# Patient Record
Sex: FEMALE | Race: WHITE | NOT HISPANIC OR LATINO | Employment: STUDENT | URBAN - METROPOLITAN AREA
[De-identification: names, ages, dates, MRNs, and addresses within clinical notes are randomized per-mention and may not be internally consistent; named-entity substitution may affect disease eponyms.]

---
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
## 2023-07-05 ENCOUNTER — HOSPITAL ENCOUNTER (EMERGENCY)
Facility: HOSPITAL | Age: 18
Discharge: HOME | End: 2023-07-05
Attending: EMERGENCY MEDICINE
Payer: COMMERCIAL

## 2023-07-05 ENCOUNTER — APPOINTMENT (EMERGENCY)
Dept: NON INVASIVE DIAGNOSTICS | Facility: HOSPITAL | Age: 18
End: 2023-07-05
Payer: COMMERCIAL

## 2023-07-05 VITALS
OXYGEN SATURATION: 100 % | DIASTOLIC BLOOD PRESSURE: 66 MMHG | RESPIRATION RATE: 18 BRPM | TEMPERATURE: 97.7 F | SYSTOLIC BLOOD PRESSURE: 135 MMHG | BODY MASS INDEX: 32.49 KG/M2 | HEART RATE: 99 BPM | WEIGHT: 201.28 LBS

## 2023-07-05 DIAGNOSIS — M79.10 MYALGIA: ICD-10-CM

## 2023-07-05 DIAGNOSIS — J02.9 SORE THROAT: Primary | ICD-10-CM

## 2023-07-05 LAB — S PYO DNA THROAT QL NAA+PROBE: NOT DETECTED

## 2023-07-05 PROCEDURE — 0241U HB NFCT DS VIR RESP RNA 4 TRGT: CPT

## 2023-07-05 PROCEDURE — 93970 EXTREMITY STUDY: CPT

## 2023-07-05 PROCEDURE — 87651 STREP A DNA AMP PROBE: CPT

## 2023-07-05 RX ORDER — ACETAMINOPHEN 325 MG/1
650 TABLET ORAL ONCE
Status: COMPLETED | OUTPATIENT
Start: 2023-07-05 | End: 2023-07-05

## 2023-07-05 RX ADMIN — ACETAMINOPHEN 325MG 650 MG: 325 TABLET ORAL at 21:30

## 2023-07-05 NOTE — Clinical Note
Yojana Gallardo was seen and treated in our emergency department on 7/5/2023. Diagnosis:     Tisha  . She may return on this date: If you have any questions or concerns, please don't hesitate to call.       Sylvester Mcintyre PA-C    ______________________________           _______________          _______________  Hospital Representative                              Date                                Time

## 2023-07-06 LAB
FLUAV RNA RESP QL NAA+PROBE: NEGATIVE
FLUBV RNA RESP QL NAA+PROBE: NEGATIVE
RSV RNA RESP QL NAA+PROBE: NEGATIVE
SARS-COV-2 RNA RESP QL NAA+PROBE: POSITIVE

## 2023-07-06 PROCEDURE — 93970 EXTREMITY STUDY: CPT | Performed by: SURGERY

## 2023-07-06 NOTE — RESULT ENCOUNTER NOTE
Patient aware of positive covid test, disucssed isolation and treatment no further question. Performed Resulted

## 2023-07-06 NOTE — DISCHARGE INSTRUCTIONS
Please return to the ED for any concerns as outlined in the AVS or for any other concerns. Please follow-up with your primary care provider in 2 days for re-evaluation and further management. Continue ibuprofen or acetaminophen as needed for pain control. Continue to stay well-hydrated. Follow-up on your COVID/flu/RSV result through MyChart.

## 2023-07-06 NOTE — ED PROVIDER NOTES
History  Chief Complaint   Patient presents with   • Sore Throat     Sore throat for two hours     49-year-old female with no reported past medical history presenting to the ED with a complaint of sore throat and bilateral calf pain. Patient reports has been having a sore throat for the last few hours. States that it is uncomfortable and is exacerbated with swallowing but she denies inability to swallow, drooling, throat swelling, tongue swelling and any other associated complaint. Reports her godmother was having similar symptoms and was diagnosed with strep throat. Reports she was around this person yesterday. (+) Rhinorrhea. (+) Mild headache which she describes as frontal without radiation. Denies sudden onset, worst headache of her life, focal deficits. Patient also reports that she has been having bilateral calf pain, described as a cramping sensation. Left >right. Patient is never had this before. No injury or trauma to the legs. No LE edema. Patient reports she is concerned for DVT as her father was diagnosed with a DVT and PE with no known inciting event, still being worked up by his doctor. Denies complaints of shortness of breath, palpitations, chest pain, personal history of PE/DVT, bleeding/clotting disorder, hemoptysis, lower extremity edema, recent prolonged travel, recent injury/trauma to the lower extremities, recent surgery, history of cancer, oral contraceptive and estrogen therapy. Denies any other complaints today and reports that she has otherwise been well. Specifically denies fever, chills, cough, nasal congestion, abdominal pain, nausea, vomiting, diarrhea, constipation, urinary complaints, back pain, neck pain, rash and any other complaint. No recent travel outside the [address] [place] [address]. No medications PTA. Prior to Admission Medications   Prescriptions Last Dose Informant Patient Reported? Taking?    DULoxetine (CYMBALTA) 30 mg delayed release capsule   No No   Sig: Take 1 capsule (30 mg total) by mouth daily   norgestimate-ethinyl estradiol (TRI-SPRINTEC) 0.18/0.215/0.25 MG-35 MCG per tablet   No No   Sig: Take 1 tablet by mouth daily   Patient not taking: No sig reported      Facility-Administered Medications: None       Past Medical History:   Diagnosis Date   • Abdominal pain    • Obesity        Past Surgical History:   Procedure Laterality Date   • DENTAL SURGERY         Family History   Problem Relation Age of Onset   • Arthritis Mother    • Asthma Mother    • Depression Mother    • ADD / ADHD Father    • ADD / ADHD Sister    • Depression Sister    • Irritable bowel syndrome Maternal Grandmother    • Stroke Maternal Grandmother    • Heart disease Paternal Grandfather    • Diabetes Maternal Aunt      I have reviewed and agree with the history as documented. E-Cigarette/Vaping     E-Cigarette/Vaping Substances     Social History     Tobacco Use   • Smoking status: Never   • Smokeless tobacco: Never       Review of Systems   HENT: Positive for rhinorrhea and sore throat. Musculoskeletal:        (+) b/l  leg pain    Neurological: Positive for headaches. All other systems reviewed and are negative. Physical Exam  Physical Exam  Vitals and nursing note reviewed. Constitutional:       General: She is not in acute distress. Appearance: She is well-developed. HENT:      Head: Normocephalic and atraumatic. Right Ear: Tympanic membrane and ear canal normal.      Left Ear: Tympanic membrane and ear canal normal.      Mouth/Throat:      Comments: MMM. Addi Piety Oropharynx patent and clear. No erythema, swelling, exudates, lesions or sores. Uvula midline. Patient maintaining oral airway and secretions without issue. Normal phonation. No TTP  or induration below the tongue or in the submandibular area. Eyes:      Conjunctiva/sclera: Conjunctivae normal.   Cardiovascular:      Rate and Rhythm: Normal rate and regular rhythm.       Heart sounds: No murmur heard.  Pulmonary:      Effort: Pulmonary effort is normal. No respiratory distress. Breath sounds: Normal breath sounds. Abdominal:      Palpations: Abdomen is soft. Tenderness: There is no abdominal tenderness. Musculoskeletal:         General: No swelling. Cervical back: Neck supple. Comments: Patient reports TTP on the proximal, lateral aspect of B/L calves. No LE edema. Calves are similar in size bilaterally. No associated erythema, swelling, bruising, skin disruption, abrasion, crepitus or any other concerning findings. B/L DP pulses 2+. No bony TTP over B/L LE. No Achilles tendon disruption. Lymphadenopathy:      Cervical: No cervical adenopathy. Skin:     General: Skin is warm and dry. Capillary Refill: Capillary refill takes less than 2 seconds. Neurological:      General: No focal deficit present. Mental Status: She is alert and oriented to person, place, and time. GCS: GCS eye subscore is 4. GCS verbal subscore is 5. GCS motor subscore is 6.    Psychiatric:         Mood and Affect: Mood normal.         Vital Signs  ED Triage Vitals   Temperature Pulse Respirations Blood Pressure SpO2   07/05/23 2050 07/05/23 2050 07/05/23 2050 07/05/23 2050 07/05/23 2050   97.7 °F (36.5 °C) 99 18 135/66 100 %      Temp src Heart Rate Source Patient Position - Orthostatic VS BP Location FiO2 (%)   -- 07/05/23 2050 -- -- --    Monitor         Pain Score       07/05/23 2130       6           Vitals:    07/05/23 2050   BP: 135/66   Pulse: 99         Visual Acuity      ED Medications  Medications   acetaminophen (TYLENOL) tablet 650 mg (650 mg Oral Given 7/5/23 2130)       Diagnostic Studies  Results Reviewed     Procedure Component Value Units Date/Time    Strep A PCR [[id number]]  (Normal) Collected: 07/05/23 2110    Lab Status: Final result Specimen: Throat Updated: 07/05/23 2138     STREP A PCR Not Detected    FLU/RSV/COVID - if FLU/RSV clinically relevant [[id number]] Collected: 07/05/23 2126    Lab Status: In process Specimen: Nares from Nose Updated: 07/05/23 2132                 VAS lower limb venous duplex study, complete bilateral    (Results Pending)              Procedures  Procedures         ED Course  ED Course as of 07/06/23 0522 Wed Jul 05, 2023 2228 Per TT from vascular tech, patient is negative for DVT. 2230 STREP A PCR: Not Detected  Negative      2230 Unfortunate this time COVID/flu/RSV are send out. We will have patient follow-up through her PCP or MyChart. Other supportive care and follow-up as outlined in the AVS.  Strict return precautions verbally communicated to the patient as outlined in the AVS.  All patient questions and concerns were answered. Patient verbally communicated their understanding and agreement to the above plan. Patient stable at discharge. Portions of the record may have been created with voice recognition software. Occasional wrong word or "sound a like" substitutions may have occurred due to the inherent limitations of voice recognition software. Read the chart carefully and recognize, using context, where substitutions have occurred. EDSON    Flowsheet Row Most Recent Value   [facility] Initial Screen: During the past 12 months, did you:    1. Drink any alcohol (more than a few sips)? No Filed at: 07/05/2023 2053   2. Smoke any marijuana or hashish No Filed at: 07/05/2023 2053   3. Use anything else to get high? ("anything else" includes illegal drugs, over the counter and prescription drugs, and things that you sniff or 'bonilla')? No Filed at: 07/05/2023 2053                                          Medical Decision Making  55-year-old female presents to the ED with a main complaint of a sore throat which began shortly prior to arrival.  Painful swallowing but no other associated complaints. Patient's godmother was recently diagnosed with strep and she was around her.   Patient also concerned for bilateral calf pain that also began today. No new inciting event. No other associated complaints. Concerned because her father was recently diagnosed with DVT and PE with no inciting event. Patient has no overt risk factors for VTE. On exam patient is a very well-appearing 20-year-old female resting in stretcher no acute distress. VSS. MMM and oropharynx patent and clear. No cervical lymphadenopathy. Patient also reports TTP over the lateral, proximal aspects of B/L calves. Left seems to be worse than right. No other concerning LE findings. B/L LE are otherwise NVI. PE otherwise unremarkable. A&O x3. GCS 15. No focal neurologic deficits. In light of patient's presenting complaints of sore throat we will check a rapid strep. Also check a COVID/flu/RSV. Patient without any specific risk factors for VTE other than possible family history. Low concern for DVT at this time however will check a duplex ultrasound to rule out DVT versus other concerning etiology. Favor strain versus sprain at this time. Doubt Achilles tendon disruption, arterial occlusion, DVT, fracture, compartment syndrome, cellulitis, osteomyelitis, NSTI. Will treat symptomatically with acetaminophen. No other complaints from patient or findings on PE to warrant further labs or imaging at this time. Likely discharge however will reevaluate after above-stated plan. Myalgia: acute illness or injury  Sore throat: acute illness or injury  Amount and/or Complexity of Data Reviewed  Labs: ordered. Decision-making details documented in ED Course. Risk  OTC drugs.           Disposition  Final diagnoses:   Sore throat   Myalgia     Time reflects when diagnosis was documented in both MDM as applicable and the Disposition within this note     Time User Action Codes Description Comment    7/5/2023 10:31 PM Jovanna West Add [J02.9] Sore throat     7/5/2023 10:31 PM Jovanna West Add [M79.10] Myalgia       ED Disposition     ED Disposition Discharge    Condition   Stable    Date/Time   Wed Jul 5, 2023 10:31 PM    Comment   Ele Osei discharge to home/self care. Follow-up Information     Follow up With Specialties Details Why [address] [facility] Emergency Medicine Go to  If symptoms worsen [address] [facility] [id number]  [address] [facility], [address], [place], [address]          Discharge Medication List as of 7/5/2023 10:32 PM      CONTINUE these medications which have NOT CHANGED    Details   DULoxetine (CYMBALTA) 30 mg delayed release capsule Take 1 capsule (30 mg total) by mouth daily, Starting Thu 8/25/2022, Normal      norgestimate-ethinyl estradiol (TRI-SPRINTEC) 0.18/0.215/0.25 MG-35 MCG per tablet Take 1 tablet by mouth daily, Starting Mon 12/10/2018, Normal             No discharge procedures on file.     PDMP Review     None          ED Provider  Electronically Signed by           Ximena Dwyer PA-C  07/06/23 0524

## 2023-07-06 NOTE — ED NOTES
Vascular tech [facility] made aware of bilateral LE study.       Eyad Rivas RN  07/05/23 [id number]

## 2023-07-19 DIAGNOSIS — F41.8 DEPRESSION WITH ANXIETY: Primary | ICD-10-CM

## 2023-07-24 ENCOUNTER — TELEPHONE (OUTPATIENT)
Dept: PSYCHIATRY | Facility: CLINIC | Age: 18
End: 2023-07-24

## 2023-07-24 NOTE — TELEPHONE ENCOUNTER
Contacted pt in regards to routine referral and to be placed on proper waitlist. Lvm for pt to contact intake dept. Please advise, pt is added to waitlist but does not have pref listed.  If they have pref, please edit referral.

## 2023-09-29 ENCOUNTER — TELEPHONE (OUTPATIENT)
Dept: PSYCHIATRY | Facility: CLINIC | Age: 18
End: 2023-09-29

## 2023-09-29 NOTE — TELEPHONE ENCOUNTER
A message was left for the patient to return call to intake. Pt will be scheduled ASAP with Triston Rg at that time.

## 2023-10-03 NOTE — TELEPHONE ENCOUNTER
Patient called and lvm stating she was returning vm she received in regards to scheduling, writer called patient back and lvm for her to call back at her earliest convenience

## 2023-10-04 NOTE — TELEPHONE ENCOUNTER
Patient contacted the office requesting to speak with the . Writer informed the patient that he would notify the  of the call. The  will be in contact at her earliest convenience to assist with scheduling.

## 2023-10-06 ENCOUNTER — TELEPHONE (OUTPATIENT)
Dept: PSYCHIATRY | Facility: CLINIC | Age: 18
End: 2023-10-06

## 2023-11-09 ENCOUNTER — TELEPHONE (OUTPATIENT)
Dept: PSYCHIATRY | Facility: CLINIC | Age: 18
End: 2023-11-09

## 2024-01-08 ENCOUNTER — SOCIAL WORK (OUTPATIENT)
Dept: BEHAVIORAL/MENTAL HEALTH CLINIC | Facility: CLINIC | Age: 19
End: 2024-01-08
Payer: COMMERCIAL

## 2024-01-08 ENCOUNTER — OFFICE VISIT (OUTPATIENT)
Dept: FAMILY MEDICINE CLINIC | Facility: CLINIC | Age: 19
End: 2024-01-08
Payer: COMMERCIAL

## 2024-01-08 ENCOUNTER — TELEPHONE (OUTPATIENT)
Dept: FAMILY MEDICINE CLINIC | Facility: CLINIC | Age: 19
End: 2024-01-08

## 2024-01-08 VITALS
HEIGHT: 66 IN | DIASTOLIC BLOOD PRESSURE: 78 MMHG | TEMPERATURE: 97.8 F | BODY MASS INDEX: 35.23 KG/M2 | WEIGHT: 219.2 LBS | SYSTOLIC BLOOD PRESSURE: 98 MMHG | HEART RATE: 60 BPM | OXYGEN SATURATION: 99 %

## 2024-01-08 DIAGNOSIS — Z11.4 SCREENING FOR HIV (HUMAN IMMUNODEFICIENCY VIRUS): ICD-10-CM

## 2024-01-08 DIAGNOSIS — Z23 ENCOUNTER FOR IMMUNIZATION: ICD-10-CM

## 2024-01-08 DIAGNOSIS — Z13.29 SCREENING FOR THYROID DISORDER: ICD-10-CM

## 2024-01-08 DIAGNOSIS — F32.A ANXIETY AND DEPRESSION: Primary | ICD-10-CM

## 2024-01-08 DIAGNOSIS — Z13.1 SCREENING FOR DIABETES MELLITUS: ICD-10-CM

## 2024-01-08 DIAGNOSIS — F41.9 ANXIETY AND DEPRESSION: Primary | ICD-10-CM

## 2024-01-08 DIAGNOSIS — Z11.59 NEED FOR HEPATITIS C SCREENING TEST: ICD-10-CM

## 2024-01-08 DIAGNOSIS — Z13.0 SCREENING, ANEMIA, DEFICIENCY, IRON: ICD-10-CM

## 2024-01-08 DIAGNOSIS — F41.8 DEPRESSION WITH ANXIETY: Primary | ICD-10-CM

## 2024-01-08 DIAGNOSIS — Z11.3 SCREEN FOR STD (SEXUALLY TRANSMITTED DISEASE): ICD-10-CM

## 2024-01-08 DIAGNOSIS — Z13.220 SCREENING, LIPID: ICD-10-CM

## 2024-01-08 PROCEDURE — 99214 OFFICE O/P EST MOD 30 MIN: CPT | Performed by: NURSE PRACTITIONER

## 2024-01-08 PROCEDURE — 90471 IMMUNIZATION ADMIN: CPT

## 2024-01-08 PROCEDURE — 90715 TDAP VACCINE 7 YRS/> IM: CPT

## 2024-01-08 PROCEDURE — 90472 IMMUNIZATION ADMIN EACH ADD: CPT

## 2024-01-08 PROCEDURE — 90791 PSYCH DIAGNOSTIC EVALUATION: CPT

## 2024-01-08 PROCEDURE — 90686 IIV4 VACC NO PRSV 0.5 ML IM: CPT

## 2024-01-08 NOTE — PROGRESS NOTES
[facility]    ASSESSMENT AND PLAN     1. Anxiety and depression  Assessment & Plan:  Symptoms and treatment options reviewed at length  We will trial sertraline   Started 25 x 4 days.  Increase to 50 if tolerating without issue   Dose/use/potential side effects reviewed  May trial as needed hydroxyzine for increased anxiety attacks  Follow-up 4 weeks for symptom check and assess medication effectiveness  Return to care sooner with problems or concerns    Complete lab work prior to follow-up    Orders:  -     sertraline (ZOLOFT) 50 mg tablet; Take 1 tablet (50 mg total) by mouth daily    2. Screen for STD (sexually transmitted disease)  -     Chlamydia/GC amplified DNA by PCR; Future  -     RPR-Syphilis Screening (Total Syphilis IGG/IGM); Future    3. Screening for diabetes mellitus  -     Comprehensive metabolic panel; Future    4. Screening, anemia, deficiency, iron  -     CBC and differential; Future    5. Screening for HIV (human immunodeficiency virus)  -     HIV 1/2 AG/AB W REFLEX [facility] and [facility] only; Future    6. Need for hepatitis C screening test  -     Hepatitis C RNA, Quantitative PCR, [facility]; Future    7. Screening for thyroid disorder  -     TSH, 3rd generation with Free T4 reflex; Future    8. Screening, lipid  -     Lipid panel; Future    9. Encounter for immunization  -     influenza vaccine, quadrivalent, 0.5 mL, preservative-free, for adult and pediatric patients 6 mos+ (AFLURIA, FLUARIX, FLULAVAL, FLUZONE)  -     TDAP VACCINE GREATER THAN OR EQUAL TO 8YO IM           SUBJECTIVE       Patient ID: Tisha Brown is a 18 y.o. female.    Chief Complaint   Patient presents with    Physical Exam       HISTORY OF PRESENT ILLNESS    Presents to discuss depression/anxiety. Note longstanding history but reports worsening over the last several months. Has been on Celexa and Cymbalta in the past. Noted some improvement but never felt her symptoms completely resolved. She has been off any  "medication now for about one year. Reports she is a perfectionist and puts a lot of pressure on herself. This increases her anxiety. Notes more emotional lately - crying easily. She is attributing it to frustration. She reports feeling randomly sad/dressed. Denies ever having any SI/HI. She believes there may a hormonal component, as she does note some change during her cycle (increased).  She is looking at restarting mental health medication and interested in starting birth control.     Of note: She was recently pregnant and was treated at [facility] with Plan B. The pregnancy was unplanned. They were practicing safe sex practices. She reports ok with her decision. She and her boyfriend are still together Both he and her parents were very supportive.            The following portions of the patient's history were reviewed and updated as appropriate: allergies, current medications, past family history, past medical history, past social history, past surgical history, and problem list.    REVIEW OF SYSTEMS  Review of Systems   Constitutional: Negative.    Respiratory: Negative.     Cardiovascular: Negative.    Neurological: Negative.    Psychiatric/Behavioral:  Positive for dysphoric mood and sleep disturbance. Negative for self-injury and suicidal ideas. The patient is nervous/anxious.         Feels over emotional       OBJECTIVE      VITAL SIGNS  BP 98/78 (BP Location: Left arm, Patient Position: Sitting, Cuff Size: Adult)   Pulse 60   Temp 97.8 °F (36.6 °C)   Ht 5' 5.5\" (1.664 m)   Wt 99.4 kg (219 lb 3.2 oz)   LMP 12/15/2023 (Approximate)   SpO2 99%   BMI 35.92 kg/m²     CURRENT MEDICATIONS    Current Outpatient Medications:     sertraline (ZOLOFT) 50 mg tablet, Take 1 tablet (50 mg total) by mouth daily, Disp: 30 tablet, Rfl: 1    norgestimate-ethinyl estradiol (TRI-SPRINTEC) 0.18/0.215/0.25 MG-35 MCG per tablet, Take 1 tablet by mouth daily (Patient not taking: Reported on 2/21/2020), Disp: 28 " tablet, Rfl: 5      PHYSICAL EXAMINATION   Physical Exam  Vitals and nursing note reviewed. Chaperone present: mom present.   Constitutional:       General: She is not in acute distress.     Appearance: Normal appearance. She is well-developed and well-groomed. She is not ill-appearing.   HENT:      Head: Normocephalic.   Cardiovascular:      Rate and Rhythm: Normal rate and regular rhythm.   Pulmonary:      Effort: Pulmonary effort is normal. No respiratory distress.      Breath sounds: Normal breath sounds and air entry.   Neurological:      Mental Status: She is alert and oriented to person, place, and time.   Psychiatric:         Attention and Perception: Attention normal.         Mood and Affect: Mood normal.         Behavior: Behavior normal.         Thought Content: Thought content normal.         Judgment: Judgment normal.

## 2024-01-08 NOTE — ASSESSMENT & PLAN NOTE
Symptoms and treatment options reviewed at length  We will trial sertraline   Started 25 x 4 days.  Increase to 50 if tolerating without issue   Dose/use/potential side effects reviewed  May trial as needed hydroxyzine for increased anxiety attacks  Follow-up 4 weeks for symptom check and assess medication effectiveness  Return to care sooner with problems or concerns    Complete lab work prior to follow-up

## 2024-01-29 ENCOUNTER — TELEMEDICINE (OUTPATIENT)
Dept: BEHAVIORAL/MENTAL HEALTH CLINIC | Facility: CLINIC | Age: 19
End: 2024-01-29
Payer: COMMERCIAL

## 2024-01-29 DIAGNOSIS — F41.9 ANXIETY AND DEPRESSION: Primary | ICD-10-CM

## 2024-01-29 DIAGNOSIS — F32.A ANXIETY AND DEPRESSION: Primary | ICD-10-CM

## 2024-01-29 PROCEDURE — 90837 PSYTX W PT 60 MINUTES: CPT

## 2024-01-29 NOTE — BH TREATMENT PLAN
"Outpatient Behavioral Health Psychotherapy Treatment Plan    Tisha Brown  2005     Date of Initial Psychotherapy Assessment: 1/8/24   Date of Current Treatment Plan: 01/29/24  Treatment Plan Target Date: ongoing  Treatment Plan Expiration Date: 6/2024    Diagnosis:   1. Anxiety and depression            Area(s) of Need: coping skills, anxiety, depression     Long Term Goal 1 (in the client's own words): \"I want to finish school and pay of debt\"     Stage of Change: Action    Target Date for completion: 12 months     Anticipated therapeutic modalities: CBT, DBT, client centered      People identified to complete this goal: Tisha and writer       Objective 1: (identify the means of measuring success in meeting the objective): Tisha will learn and utilize skills to organize thoughts to complete tasks in a timely manner       Objective 2: (identify the means of measuring success in meeting the objective): Tisha will learn skills to decrease depressive symptoms that impede ability to complete tasks on time      Objective 3: Tisha will learn and implement tools to prioritize her needs instead of others to support complete tasks and mitigating depressive symptoms     Long Term Goal 2 (in the client's own words): \"I need help slowing down my thoughts and help my anxiety\"    Stage of Change: Preparation    Target Date for completion: ongoing     Anticipated therapeutic modalities: CBT.DBT, client centered      People identified to complete this goal: Tisha and writer       Objective 1: (identify the means of measuring success in meeting the objective): Tisha will identify and explore underlying factors that contribute to low motivation and heightened anxiety       Objective 2: (identify the means of measuring success in meeting the objective): Tisha will implement techniques to challenge and reframe anxious thoughts          I am currently under the care of a [facility] psychiatric provider: no    My [facility] psychiatric provider is: Yocasta    I am currently taking psychiatric medications: Yes, as prescribed    I feel that I will be ready for discharge from mental health care when I reach the following (measurable goal/objective): unknown at this time    For children and adults who have a legal guardian:   Has there been any change to custody orders and/or guardianship status? NA. If yes, attach updated documentation.    I have created my Crisis Plan and have been offered a copy of this plan    Behavioral Health Treatment Plan [facility]: Diagnosis and Treatment Plan explained to Tisha Brown acknowledges an understanding of their diagnosis. Tisha Brown agrees to this treatment plan.    I have been offered a copy of this Treatment Plan. yes

## 2024-01-29 NOTE — PSYCH
Copied forward from intake note on 1/8 with primary care     SUBJECTIVE     Patient ID: Tisha Brown is a 18 y.o. female.         Chief Complaint   Patient presents with    Physical Exam         HISTORY OF PRESENT ILLNESS     Presents to discuss depression/anxiety. Note longstanding history but reports worsening over the last several months. Has been on Celexa and Cymbalta in the past. Noted some improvement but never felt her symptoms completely resolved. She has been off any medication now for about one year. Reports she is a perfectionist and puts a lot of pressure on herself. This increases her anxiety. Notes more emotional lately - crying easily. She is attributing it to frustration. She reports feeling randomly sad/dressed. Denies ever having any SI/HI. She believes there may a hormonal component, as she does note some change during her cycle (increased).  She is looking at restarting mental health medication and interested in starting birth control.      Of note: She was recently pregnant and was treated at [facility] with Plan B. The pregnancy was unplanned. They were practicing safe sex practices. She reports ok with her decision. She and her boyfriend are still together Both he and her parents were very supportive.                The following portions of the patient's history were reviewed and updated as appropriate: allergies, current medications, past family history, past medical history, past social history, past surgical history, and problem list.     REVIEW OF SYSTEMS  Review of Systems   Constitutional: Negative.    Respiratory: Negative.     Cardiovascular: Negative.    Neurological: Negative.    Psychiatric/Behavioral:  Positive for dysphoric mood and sleep disturbance. Negative for self-injury and suicidal ideas. The patient is nervous/anxious.         Feels over emotional         OBJECTIVE        VITAL SIGNS  BP 98/78 (BP Location: Left arm, Patient Position: Sitting, Cuff Size: Adult)   " Pulse 60   Temp 97.8 °F (36.6 °C)   Ht 5' 5.5\" (1.664 m)   Wt 99.4 kg (219 lb 3.2 oz)   LMP 12/15/2023 (Approximate)   SpO2 99%   BMI 35.92 kg/m²      CURRENT MEDICATIONS     Current Outpatient Medications:     sertraline (ZOLOFT) 50 mg tablet, Take 1 tablet (50 mg total) by mouth daily, Disp: 30 tablet, Rfl: 1    norgestimate-ethinyl estradiol (TRI-SPRINTEC) 0.18/0.215/0.25 MG-35 MCG per tablet, Take 1 tablet by mouth daily (Patient not taking: Reported on 2/21/2020), Disp: 28 tablet, Rfl: 5        PHYSICAL EXAMINATION   Physical Exam  Vitals and nursing note reviewed. Chaperone present: mom present.   Constitutional:       General: She is not in acute distress.     Appearance: Normal appearance. She is well-developed and well-groomed. She is not ill-appearing.   HENT:      Head: Normocephalic.   Cardiovascular:      Rate and Rhythm: Normal rate and regular rhythm.   Pulmonary:      Effort: Pulmonary effort is normal. No respiratory distress.      Breath sounds: Normal breath sounds and air entry.   Neurological:      Mental Status: She is alert and oriented to person, place, and time.   Psychiatric:         Attention and Perception: Attention normal.         Mood and Affect: Mood normal.         Behavior: Behavior normal.         Thought Content: Thought content normal.         Judgment: Judgment normal.     Behavioral Health Psychotherapy Assessment    Date of Initial Psychotherapy Assessment: 01/29/24  Referral Source: self  Has a release of information been signed for the referral source? NA    Preferred Name: Tisha Brown  Preferred Pronouns: She/her  YOB: 2005 Age: 18 y.o.  Sex assigned at birth: female   Gender Identity: female  Race:   Preferred Language: English    Emergency Contact:  Full Name: Julia Brown   Relationship to Client: Mom  Contact information: [phone]     Primary Care Physician:  MARILEE Quintana  [address]  [facility]" [id number]  [phone]  Has a release of information been signed? No    Physical Health History:  Past surgical procedures: recent    Do you have a history of any of the following: other n/a  Do you have any mobility issues? No    Relevant Family History:  Mom severe depression/anxiety/ mood instability     Presenting Problem (What brings you in?)  Anxiety and depression, recent pregnancy that was terminated     Mental Health Advance Directive:  Do you currently have a Mental Health Advance Directive?no    Diagnosis:   Diagnosis ICD-10-CM Associated Orders   1. Depression with anxiety  F41.8           Psych Initial Assessment    Visit start and stop times:    24  Start Time: 1332  Stop Time: 1430  Total Visit Time: 58 minutes

## 2024-01-29 NOTE — PSYCH
"Behavioral Health Psychotherapy Progress Note    Psychotherapy Provided: Individual Psychotherapy     1. Anxiety and depression            Goals addressed in session: Goal 1 and Goal 2     DATA: completed tx goal, plan and safety plan     During this session, this clinician used the following therapeutic modalities: Client-centered Therapy, Cognitive Behavioral Therapy, Solution-Focused Therapy, and Supportive Psychotherapy    Substance Abuse was not addressed during this session. If the client is diagnosed with a co-occurring substance use disorder, please indicate any changes in the frequency or amount of use: . Stage of change for addressing substance use diagnoses: No substance use/Not applicable    ASSESSMENT:  Tisha Brown presents with a Euthymic/ normal, Anxious, and Depressed mood.     her affect is Normal range and intensity, which is congruent, with her mood and the content of the session. The client has not made progress on their goals.     Tisha Brown presents with a none risk of suicide, none risk of self-harm, and none risk of harm to others.    For any risk assessment that surpasses a \"low\" rating, a safety plan must be developed.    A safety plan was indicated: no  If yes, describe in detail     PLAN: Between sessions, Tisha Brown will continue to work on goals and emotion management. At the next session, the therapist will use Engagement Strategies, Client-centered Therapy, Cognitive Behavioral Therapy, Solution-Focused Therapy, and Supportive Psychotherapy to address anxiety and depression .    Behavioral Health Treatment Plan and Discharge Planning: Tisha Brown is aware of and agrees to continue to work on their treatment plan. They have identified and are working toward their discharge goals. yes    Visit start and stop times:    01/29/24  Start Time: 1234  Stop Time: 1330  Total Visit Time: 56 minutes  "

## 2024-02-05 DIAGNOSIS — F32.A ANXIETY AND DEPRESSION: ICD-10-CM

## 2024-02-05 DIAGNOSIS — F41.9 ANXIETY AND DEPRESSION: ICD-10-CM

## 2024-02-12 ENCOUNTER — TELEMEDICINE (OUTPATIENT)
Dept: BEHAVIORAL/MENTAL HEALTH CLINIC | Facility: CLINIC | Age: 19
End: 2024-02-12
Payer: COMMERCIAL

## 2024-02-12 ENCOUNTER — TELEPHONE (OUTPATIENT)
Dept: PSYCHIATRY | Facility: CLINIC | Age: 19
End: 2024-02-12

## 2024-02-12 DIAGNOSIS — F41.9 ANXIETY AND DEPRESSION: Primary | ICD-10-CM

## 2024-02-12 DIAGNOSIS — F32.A ANXIETY AND DEPRESSION: Primary | ICD-10-CM

## 2024-02-12 PROCEDURE — 90834 PSYTX W PT 45 MINUTES: CPT

## 2024-02-13 NOTE — PSYCH
"Behavioral Health Psychotherapy Progress Note    Psychotherapy Provided: Individual Psychotherapy     1. Anxiety and depression            Goals addressed in session: Goal 1 and Goal 2     DATA: Focus in session with Tisha was on communication struggles with her mom and managing her emotions so that she can be more mindful in conversation. Tisha also discussed feeling frustrated with her boyfriend and not knowing how to manage his \"immaturity\" in regards to not having a job, money and overall lack of responsibility. Encouraged her to think about her life goals and how she can achieve them.     During this session, this clinician used the following therapeutic modalities: Client-centered Therapy, Cognitive Behavioral Therapy, Solution-Focused Therapy, and Supportive Psychotherapy    Substance Abuse was not addressed during this session. If the client is diagnosed with a co-occurring substance use disorder, please indicate any changes in the frequency or amount of use: . Stage of change for addressing substance use diagnoses: No substance use/Not applicable    ASSESSMENT:  Tisha Brown presents with a Euthymic/ normal and Anxious mood.     her affect is Normal range and intensity, which is congruent, with her mood and the content of the session. The client has made progress on their goals.     Tisha Brown presents with a none risk of suicide, none risk of self-harm, and none risk of harm to others.    For any risk assessment that surpasses a \"low\" rating, a safety plan must be developed.    A safety plan was indicated: no  If yes, describe in detail     PLAN: Between sessions, Tisha Brown will continue to work through anxiety triggers and communication needs. At the next session, the therapist will use Client-centered Therapy, Cognitive Behavioral Therapy, Solution-Focused Therapy, and Supportive Psychotherapy to address anxiety and depression .    Behavioral Health Treatment Plan and Discharge Planning: " Tisha Brown is aware of and agrees to continue to work on their treatment plan. They have identified and are working toward their discharge goals. yes    Visit start and stop times:    02/13/24  Start Time: 1030  Stop Time: 1120  Total Visit Time: 50 minutes    Virtual Regular Visit    Verification of patient location:    Patient is located at Home in the following state in which I hold an active license [place]      Assessment/Plan:    Problem List Items Addressed This Visit       Anxiety and depression - Primary           Reason for visit is   Chief Complaint   Patient presents with    Virtual Regular Visit          Encounter provider TEO Whitehead    Provider located at [facility]  [facility]  [address] [id number] [phone]      Recent Visits  Date Type Provider Dept   02/12/24 Telephone TEO Whitehead [facility]   02/12/24 Telemedicine TEO Whitehead [facility]   Showing recent visits within past 7 days and meeting all other requirements  Future Appointments  No visits were found meeting these conditions.  Showing future appointments within next 150 days and meeting all other requirements       The patient was identified by name and date of birth. Tisha Brown was informed that this is a telemedicine visit and that the visit is being conducted throughthe [employer] Teams platform. She agrees to proceed..  My office door was closed. No one else was in the room.  She acknowledged consent and understanding of privacy and security of the video platform. The patient has agreed to participate and understands they can discontinue the visit at any time.    Patient is aware this is a billable service.     Subjective  Tisha Brown is a 18 y.o. female  .      HPI     Past Medical History:   Diagnosis Date    Abdominal pain     Obesity        Past Surgical History:   Procedure  Laterality Date    DENTAL SURGERY         Current Outpatient Medications   Medication Sig Dispense Refill    norgestimate-ethinyl estradiol (TRI-SPRINTEC) 0.18/0.215/0.25 MG-35 MCG per tablet Take 1 tablet by mouth daily (Patient not taking: Reported on 2/21/2020) 28 tablet 5    sertraline (ZOLOFT) 50 mg tablet Take 1 tablet (50 mg total) by mouth daily 30 tablet 1     No current facility-administered medications for this visit.        No Known Allergies    Review of Systems    Video Exam    There were no vitals filed for this visit.    Physical Exam

## 2024-02-21 ENCOUNTER — TELEPHONE (OUTPATIENT)
Dept: FAMILY MEDICINE CLINIC | Facility: CLINIC | Age: 19
End: 2024-02-21

## 2024-02-21 NOTE — TELEPHONE ENCOUNTER
Pt came in late for her [facility]-med check, went back to ask TU if she could still see her she was in with a pt. DM said she will check with her when she comes out. I came back to tell pt and she said to just cancel it, it was her bad. I asked if she was sure and if she wanted to reschedule since it was a med check, she said yes she was sure, she would figure it out.

## 2024-02-26 ENCOUNTER — TELEMEDICINE (OUTPATIENT)
Dept: BEHAVIORAL/MENTAL HEALTH CLINIC | Facility: CLINIC | Age: 19
End: 2024-02-26
Payer: COMMERCIAL

## 2024-02-26 DIAGNOSIS — F32.A ANXIETY AND DEPRESSION: Primary | ICD-10-CM

## 2024-02-26 DIAGNOSIS — F41.9 ANXIETY AND DEPRESSION: Primary | ICD-10-CM

## 2024-02-26 PROCEDURE — 90832 PSYTX W PT 30 MINUTES: CPT

## 2024-03-26 ENCOUNTER — TELEPHONE (OUTPATIENT)
Dept: PSYCHIATRY | Facility: CLINIC | Age: 19
End: 2024-03-26

## 2024-04-08 ENCOUNTER — OFFICE VISIT (OUTPATIENT)
Dept: FAMILY MEDICINE CLINIC | Facility: CLINIC | Age: 19
End: 2024-04-08
Payer: COMMERCIAL

## 2024-04-08 ENCOUNTER — TELEMEDICINE (OUTPATIENT)
Dept: BEHAVIORAL/MENTAL HEALTH CLINIC | Facility: CLINIC | Age: 19
End: 2024-04-08

## 2024-04-08 VITALS
OXYGEN SATURATION: 98 % | HEART RATE: 66 BPM | HEIGHT: 65 IN | BODY MASS INDEX: 37.25 KG/M2 | SYSTOLIC BLOOD PRESSURE: 108 MMHG | WEIGHT: 223.6 LBS | DIASTOLIC BLOOD PRESSURE: 70 MMHG | TEMPERATURE: 97.7 F

## 2024-04-08 DIAGNOSIS — F41.9 ANXIETY AND DEPRESSION: Primary | ICD-10-CM

## 2024-04-08 DIAGNOSIS — Z30.09 BIRTH CONTROL COUNSELING: ICD-10-CM

## 2024-04-08 DIAGNOSIS — F41.8 DEPRESSION WITH ANXIETY: Primary | ICD-10-CM

## 2024-04-08 DIAGNOSIS — F32.A ANXIETY AND DEPRESSION: Primary | ICD-10-CM

## 2024-04-08 PROCEDURE — 99213 OFFICE O/P EST LOW 20 MIN: CPT | Performed by: NURSE PRACTITIONER

## 2024-04-08 NOTE — ASSESSMENT & PLAN NOTE
We discussed restarting birth control.    If she is tolerating the SSRI without issue, we can restart birth control in the next few weeks.    Discussed we will need to check Hcg prior to initiating  She has tolerated combo birth control in the past, so I do not expect any problems or concerns   She will be due for her first GYN exam-Pap at age 19 in August    Of note, she is in a monogamous relationship. They are currently using condoms for contraception.

## 2024-04-08 NOTE — ASSESSMENT & PLAN NOTE
Will restart sertraline today  She will start again at half tab, and increase to the full tab (50 mg) in the next 4 to 5 days if continues to tolerate  Follow-up in 4 weeks in office with problems or concerns-or she may send me a App[employer]able[employer] message if she is doing well.  Return to care sooner with problems/concerns

## 2024-04-08 NOTE — PROGRESS NOTES
[facility]    ASSESSMENT AND PLAN     1. Anxiety and depression  Assessment & Plan:  Will restart sertraline today  She will start again at half tab, and increase to the full tab (50 mg) in the next 4 to 5 days if continues to tolerate  Follow-up in 4 weeks in office with problems or concerns-or she may send me a NewHive message if she is doing well.  Return to care sooner with problems/concerns        Orders:  -     sertraline (ZOLOFT) 50 mg tablet; Take 1 tablet (50 mg total) by mouth daily    2. Birth control counseling  Assessment & Plan:  We discussed restarting birth control.    If she is tolerating the SSRI without issue, we can restart birth control in the next few weeks.    Discussed we will need to check Hcg prior to initiating  She has tolerated combo birth control in the past, so I do not expect any problems or concerns   She will be due for her first GYN exam-Pap at age 19 in August    Of note, she is in a monogamous relationship. They are currently using condoms for contraception.             SUBJECTIVE       Patient ID: Tisha Brown is a 18 y.o. female.    Chief Complaint   Patient presents with    Follow-up       HISTORY OF PRESENT ILLNESS    Follow-up  Presents today to restart sertraline.  Was started on 50 mg in January for anxiety/depression.  Was feeling well on that dose.  But missed appointments, and was unable to get refills.  She is here today to restart.  She has been attending counseling, but does admit to missing a few appointments with them as well, due to her hectic schedule.  She did feel the counseling was helping. she had a visit today (first 1 in the last month).  From mental health perspective, she does admit to increased anxiety, increased fatigue and decreased motivation since off the Sertraline.  Denies depression - No SI/HI. She is currently working 2 jobs and attending college.  She has 1 day off per week (which is Monday).           The following portions of the  "patient's history were reviewed and updated as appropriate: allergies, current medications, past family history, past medical history, past social history, past surgical history, and problem list.    REVIEW OF SYSTEMS  Review of Systems   Constitutional:  Positive for activity change, appetite change and fatigue.   Respiratory: Negative.     Cardiovascular: Negative.    Neurological: Negative.    Psychiatric/Behavioral:  Positive for sleep disturbance. Negative for self-injury and suicidal ideas. The patient is nervous/anxious.        OBJECTIVE      VITAL SIGNS  /70 (BP Location: Left arm, Patient Position: Sitting, Cuff Size: Adult)   Pulse 66   Temp 97.7 °F (36.5 °C)   Ht 5' 5.25\" (1.657 m)   Wt 101 kg (223 lb 9.6 oz)   LMP 03/19/2024 (Exact Date)   SpO2 98%   BMI 36.92 kg/m²     CURRENT MEDICATIONS    Current Outpatient Medications:     sertraline (ZOLOFT) 50 mg tablet, Take 1 tablet (50 mg total) by mouth daily, Disp: 90 tablet, Rfl: 1    norgestimate-ethinyl estradiol (TRI-SPRINTEC) 0.18/0.215/0.25 MG-35 MCG per tablet, Take 1 tablet by mouth daily (Patient not taking: Reported on 2/21/2020), Disp: 28 tablet, Rfl: 5      PHYSICAL EXAMINATION   Physical Exam  Vitals and nursing note reviewed.   Constitutional:       General: She is not in acute distress.     Appearance: Normal appearance. She is not ill-appearing.   HENT:      Head: Normocephalic.   Cardiovascular:      Rate and Rhythm: Normal rate and regular rhythm.   Pulmonary:      Effort: Pulmonary effort is normal. No respiratory distress.      Breath sounds: Normal breath sounds and air entry.   Skin:     General: Skin is warm and dry.   Neurological:      General: No focal deficit present.      Mental Status: She is alert and oriented to person, place, and time.   Psychiatric:         Attention and Perception: Attention normal.         Mood and Affect: Mood normal.         Behavior: Behavior normal.         "

## 2024-04-10 NOTE — PSYCH
"Behavioral Health Psychotherapy Progress Note    Psychotherapy Provided: Individual Psychotherapy     1. Depression with anxiety            Goals addressed in session: Goal 1 and Goal 2     DATA: Tisha identified being in a deeper depression recently. Identified that her emotions are low, she has low motivation to engage in her surroundings and needs support in managing those low moods. Focused some of session on her relationship, feeling like she is being used by him- stressed about finances and \"surviving life.\" Encouraged her to attend session and working on positive coping skills to decrease intrusive thoughts   During this session, this clinician used the following therapeutic modalities: Client-centered Therapy, Cognitive Behavioral Therapy, Cognitive Processing Therapy, Solution-Focused Therapy, and Supportive Psychotherapy    Substance Abuse was not addressed during this session. If the client is diagnosed with a co-occurring substance use disorder, please indicate any changes in the frequency or amount of use: . Stage of change for addressing substance use diagnoses: No substance use/Not applicable    ASSESSMENT:  Tisha Brown presents with a Euthymic/ normal and Depressed mood.     her affect is Normal range and intensity, Blunted, Flat, and Tearful, which is congruent, with her mood and the content of the session. The client has not made progress on their goals.     Tisha Brown presents with a minimal risk of suicide, minimal risk of self-harm, and none risk of harm to others.    For any risk assessment that surpasses a \"low\" rating, a safety plan must be developed.    A safety plan was indicated: no  If yes, describe in detail     PLAN: Between sessions, Tisha Brown will continue to engage in session to work through depressive thoughts. At the next session, the therapist will use Client-centered Therapy and Cognitive Behavioral Therapy to address depression.    Behavioral Health Treatment Plan and " Discharge Planning: Tisha Brown is aware of and agrees to continue to work on their treatment plan. They have identified and are working toward their discharge goals. yes    Visit start and stop times:    04/10/24  Start Time: 1230  Stop Time: 1315  Total Visit Time: 45 minutes    Virtual Regular Visit    Verification of patient location:    Patient is located at Home in the following state in which I hold an active license [place]      Assessment/Plan:    Problem List Items Addressed This Visit       Depression with anxiety - Primary         Reason for visit is   Chief Complaint   Patient presents with    Virtual Regular Visit          Encounter provider TEO Whitehead    Provider located at [facility]  [facility]  [address] [address] [phone]      Recent Visits  Date Type Provider Dept   04/08/24 Office Visit MARILEE Quintana [facility]   04/08/24 Telemedicine TEO Whitehead [facility]   Showing recent visits within past 7 days and meeting all other requirements  Future Appointments  No visits were found meeting these conditions.  Showing future appointments within next 150 days and meeting all other requirements       The patient was identified by name and date of birth. Tisha Brown was informed that this is a telemedicine visit and that the visit is being conducted throughthe Epic Embedded platform. She agrees to proceed..  My office door was closed. No one else was in the room.  She acknowledged consent and understanding of privacy and security of the video platform. The patient has agreed to participate and understands they can discontinue the visit at any time.    Patient is aware this is a billable service.     Subjective  Tisha Brown is a 18 y.o. female  .      HPI     Past Medical History:   Diagnosis Date    Abdominal pain     Obesity        Past Surgical History:    Procedure Laterality Date    DENTAL SURGERY         Current Outpatient Medications   Medication Sig Dispense Refill    norgestimate-ethinyl estradiol (TRI-SPRINTEC) 0.18/0.215/0.25 MG-35 MCG per tablet Take 1 tablet by mouth daily (Patient not taking: Reported on 2/21/2020) 28 tablet 5    sertraline (ZOLOFT) 50 mg tablet Take 1 tablet (50 mg total) by mouth daily 90 tablet 1     No current facility-administered medications for this visit.        No Known Allergies    Review of Systems    Video Exam    There were no vitals filed for this visit.    Physical Exam

## 2024-04-22 ENCOUNTER — TELEMEDICINE (OUTPATIENT)
Dept: BEHAVIORAL/MENTAL HEALTH CLINIC | Facility: CLINIC | Age: 19
End: 2024-04-22
Payer: COMMERCIAL

## 2024-04-22 DIAGNOSIS — F41.8 DEPRESSION WITH ANXIETY: Primary | ICD-10-CM

## 2024-04-22 PROCEDURE — 90832 PSYTX W PT 30 MINUTES: CPT

## 2024-04-23 NOTE — PSYCH
"Behavioral Health Psychotherapy Progress Note    Psychotherapy Provided: Individual Psychotherapy     1. Depression with anxiety            Goals addressed in session: Goal 1 and Goal 2     DATA: Focus in session was on boundary setting- identified that she \"tried it out\" and recognized that some things are a trauma response on her end that she takes out on her boyfriend. Worked through some of that internal dialogue and some phraisng she can use when overwhelmed.   During this session, this clinician used the following therapeutic modalities: Client-centered Therapy, Cognitive Behavioral Therapy, Cognitive Processing Therapy, Solution-Focused Therapy, and Supportive Psychotherapy    Substance Abuse was not addressed during this session. If the client is diagnosed with a co-occurring substance use disorder, please indicate any changes in the frequency or amount of use: . Stage of change for addressing substance use diagnoses: No substance use/Not applicable    ASSESSMENT:  Tisha Brown presents with a Euthymic/ normal and Depressed mood.     her affect is Normal range and intensity, which is congruent, with her mood and the content of the session. The client has not made progress on their goals.     Tisha Brown presents with a none risk of suicide, none risk of self-harm, and none risk of harm to others.    For any risk assessment that surpasses a \"low\" rating, a safety plan must be developed.    A safety plan was indicated: no  If yes, describe in detail     PLAN: Between sessions, Tisha Brown will continue to work through internal dialgoue. At the next session, the therapist will use Client-centered Therapy, Cognitive Behavioral Therapy, Cognitive Processing Therapy, Motivational Interviewing, and Supportive Psychotherapy to address depression and anxiety.    Behavioral Health Treatment Plan and Discharge Planning: Tisha Brown is aware of and agrees to continue to work on their treatment plan. They have " identified and are working toward their discharge goals. yes    Visit start and stop times:    04/23/24  Start Time: 1234  Stop Time: 1310  Total Visit Time: 36 minutes    Virtual Regular Visit    Verification of patient location:    Patient is located at Home in the following state in which I hold an active license [place]      Assessment/Plan:    Problem List Items Addressed This Visit       Depression with anxiety - Primary            Reason for visit is   Chief Complaint   Patient presents with    Virtual Regular Visit          Encounter provider TEO Whitehead    Provider located at [facility] [place]  [facility] [place]  [address]  [place] [address]  [phone]      Recent Visits  Date Type Provider Dept   04/22/24 Telemedicine TE[facility]head [facility] [place]   Showing recent visits within past 7 days and meeting all other requirements  Future Appointments  No visits were found meeting these conditions.  Showing future appointments within next 150 days and meeting all other requirements       The patient was identified by name and date of birth. Tisha Brown was informed that this is a telemedicine visit and that the visit is being conducted throughthe Epic Embedded platform. She agrees to proceed..  My office door was closed. No one else was in the room.  She acknowledged consent and understanding of privacy and security of the video platform. The patient has agreed to participate and understands they can discontinue the visit at any time.    Patient is aware this is a billable service.     Subjective  Tisha Brown is a 18 y.o. female  .      HPI     Past Medical History:   Diagnosis Date    Abdominal pain     Obesity        Past Surgical History:   Procedure Laterality Date    DENTAL SURGERY         Current Outpatient Medications   Medication Sig Dispense Refill    norgestimate-ethinyl estradiol (TRI-SPRINTEC)  0.18/0.215/0.25 MG-35 MCG per tablet Take 1 tablet by mouth daily (Patient not taking: Reported on 2/21/2020) 28 tablet 5    sertraline (ZOLOFT) 50 mg tablet Take 1 tablet (50 mg total) by mouth daily 90 tablet 1     No current facility-administered medications for this visit.        No Known Allergies    Review of Systems    Video Exam    There were no vitals filed for this visit.    Physical Exam

## 2024-05-22 ENCOUNTER — TELEMEDICINE (OUTPATIENT)
Dept: BEHAVIORAL/MENTAL HEALTH CLINIC | Facility: CLINIC | Age: 19
End: 2024-05-22

## 2024-05-22 DIAGNOSIS — F41.8 DEPRESSION WITH ANXIETY: Primary | ICD-10-CM

## 2024-05-23 NOTE — PSYCH
Virtual Regular Visit    Verification of patient location:    Patient is located at {[facility] Virtual Patient Location:[address]36} in the following state in which I hold an active license {[facility] virtual patient location:[address]}      Assessment/Plan:    Problem List Items Addressed This Visit     Depression with anxiety - Primary       Goals addressed in session: {GOALS:90773}          Reason for visit is   Chief Complaint   Patient presents with   • Virtual Regular Visit          Encounter provider TEO Whitehead      Recent Visits  Date Type Provider Dept   05/22/24 Telemedicine TEO Whitehead [facility] [place]   Showing recent visits within past 7 days and meeting all other requirements  Future Appointments  No visits were found meeting these conditions.  Showing future appointments within next 150 days and meeting all other requirements       The patient was identified by name and date of birth. Tisha Brown was informed that this is a telemedicine visit and that the visit is being conducted through{[facility] VIRTUAL VISIT MEDIUM:05294}.  {Telemedicine confidentiality :74643} {Telemedicine participants:18491}  She acknowledged consent and understanding of privacy and security of the video platform. The patient has agreed to participate and understands they can discontinue the visit at any time.    Patient is aware this is a billable service.     Subjective  Tisha Brown is a 18 y.o. female *** .      HPI     Past Medical History:   Diagnosis Date   • Abdominal pain    • Obesity        Past Surgical History:   Procedure Laterality Date   • DENTAL SURGERY         Current Outpatient Medications   Medication Sig Dispense Refill   • norgestimate-ethinyl estradiol (TRI-SPRINTEC) 0.18/0.215/0.25 MG-35 MCG per tablet Take 1 tablet by mouth daily (Patient not taking: Reported on 2/21/2020) 28 tablet 5   • sertraline (ZOLOFT) 50 mg tablet Take 1 tablet (50 mg total) by mouth daily 90 tablet 1     No current  facility-administered medications for this visit.        No Known Allergies    Review of Systems    Video Exam    There were no vitals filed for this visit.    Physical Exam     Visit Time    Visit Start Time: ***  Visit Stop Time: ***  Total Visit Duration: {Psych Total Visit Time:44217}

## 2024-05-23 NOTE — PSYCH
"Behavioral Health Psychotherapy Progress Note    Psychotherapy Provided: Individual Psychotherapy     1. Depression with anxiety            Goals addressed in session: Goal 1 and Goal 2     DATA: Tisha identified that she is not doing well. She identified feeling low motivation, decrease in activities she used to enjoy, struggling to complete tasks, failing 2 college classes and feeling out of control. Spent time focusing on supporting Lazaro through these emotions today. Focus on how to manage expectations to decrease anxiety and depressive symptoms.     During this session, this clinician used the following therapeutic modalities: Client-centered Therapy, Cognitive Behavioral Therapy, Solution-Focused Therapy, and Supportive Psychotherapy    Substance Abuse was not addressed during this session. If the client is diagnosed with a co-occurring substance use disorder, please indicate any changes in the frequency or amount of use: . Stage of change for addressing substance use diagnoses: No substance use/Not applicable    ASSESSMENT:  Tisha Brown presents with a Euthymic/ normal, Anxious, and Depressed mood.     her affect is Normal range and intensity, Flat, and Tearful, which is congruent, with her mood and the content of the session. The client has made progress on their goals.     Tisha Brown presents with a minimal risk of suicide, minimal risk of self-harm, and none risk of harm to others.    For any risk assessment that surpasses a \"low\" rating, a safety plan must be developed.    A safety plan was indicated: no  If yes, describe in detail     PLAN: Between sessions, Tisha Brown will continue to practice coping skills to decrease anxitey and depression. At the next session, the therapist will use Client-centered Therapy, Cognitive Behavioral Therapy, Cognitive Processing Therapy, Solution-Focused Therapy, and Supportive Psychotherapy to address anxiety and depression.    Behavioral Health Treatment Plan " and Discharge Planning: Tisha Brown is aware of and agrees to continue to work on their treatment plan. They have identified and are working toward their discharge goals. yes    Visit start and stop times:    05/23/24  Start Time: 1008  Stop Time: 1046  Total Visit Time: 38 minutes  Virtual Regular Visit    Verification of patient location:    Patient is located at Home in the following state in which I hold an active license [place]      Assessment/Plan:    Problem List Items Addressed This Visit       Depression with anxiety - Primary            Reason for visit is   Chief Complaint   Patient presents with    Virtual Regular Visit          Encounter provider TEO Whitehead      Recent Visits  Date Type Provider Dept   05/22/24 Telemedicine TEO [facility]head [facility] [place]   Showing recent visits within past 7 days and meeting all other requirements  Future Appointments  No visits were found meeting these conditions.  Showing future appointments within next 150 days and meeting all other requirements       The patient was identified by name and date of birth. Tisha Brown was informed that this is a telemedicine visit and that the visit is being conducted throughthe Epic Embedded platform. She agrees to proceed..  My office door was closed. No one else was in the room.  She acknowledged consent and understanding of privacy and security of the video platform. The patient has agreed to participate and understands they can discontinue the visit at any time.    Patient is aware this is a billable service.     Subjective  Tisha Brown is a 18 y.o. female  .      HPI     Past Medical History:   Diagnosis Date    Abdominal pain     Obesity        Past Surgical History:   Procedure Laterality Date    DENTAL SURGERY         Current Outpatient Medications   Medication Sig Dispense Refill    norgestimate-ethinyl estradiol (TRI-SPRINTEC) 0.18/0.215/0.25 MG-35 MCG per tablet Take 1 tablet by mouth daily  (Patient not taking: Reported on 2/21/2020) 28 tablet 5    sertraline (ZOLOFT) 50 mg tablet Take 1 tablet (50 mg total) by mouth daily 90 tablet 1     No current facility-administered medications for this visit.        No Known Allergies    Review of Systems    Video Exam    There were no vitals filed for this visit.    Physical Exam

## 2024-06-03 ENCOUNTER — TELEPHONE (OUTPATIENT)
Dept: PSYCHIATRY | Facility: CLINIC | Age: 19
End: 2024-06-03

## 2024-06-10 ENCOUNTER — TELEPHONE (OUTPATIENT)
Dept: PSYCHIATRY | Facility: CLINIC | Age: 19
End: 2024-06-10

## 2024-06-10 NOTE — TELEPHONE ENCOUNTER
Writer contacted patient and informed them their talk therapists is no longer at the practice and all upcoming apts have been cancelled. Patient stated they would like to be discharged.

## 2024-10-02 ENCOUNTER — OFFICE VISIT (OUTPATIENT)
Dept: OBGYN CLINIC | Facility: CLINIC | Age: 19
End: 2024-10-02
Payer: COMMERCIAL

## 2024-10-02 VITALS
DIASTOLIC BLOOD PRESSURE: 70 MMHG | WEIGHT: 244 LBS | SYSTOLIC BLOOD PRESSURE: 122 MMHG | BODY MASS INDEX: 40.65 KG/M2 | HEIGHT: 65 IN

## 2024-10-02 DIAGNOSIS — Z78.9 USES BIRTH CONTROL: Primary | ICD-10-CM

## 2024-10-02 DIAGNOSIS — Z01.419 ENCOUNTER FOR GYNECOLOGICAL EXAMINATION: ICD-10-CM

## 2024-10-02 DIAGNOSIS — Z20.2 POSSIBLE EXPOSURE TO STI: ICD-10-CM

## 2024-10-02 PROCEDURE — 87491 CHLMYD TRACH DNA AMP PROBE: CPT | Performed by: OBSTETRICS & GYNECOLOGY

## 2024-10-02 PROCEDURE — S0610 ANNUAL GYNECOLOGICAL EXAMINA: HCPCS | Performed by: OBSTETRICS & GYNECOLOGY

## 2024-10-02 PROCEDURE — G0145 SCR C/V CYTO,THINLAYER,RESCR: HCPCS | Performed by: OBSTETRICS & GYNECOLOGY

## 2024-10-02 PROCEDURE — 87591 N.GONORRHOEAE DNA AMP PROB: CPT | Performed by: OBSTETRICS & GYNECOLOGY

## 2024-10-02 RX ORDER — NORGESTIMATE AND ETHINYL ESTRADIOL 0.25-0.035
1 KIT ORAL DAILY
Qty: 84 TABLET | Refills: 3 | Status: SHIPPED | OUTPATIENT
Start: 2024-10-02

## 2024-10-02 NOTE — PROGRESS NOTES
ASSESSMENT & PLAN: Tisha Brown is a 19 y.o. No obstetric history on file. with normal gynecologic exam.    1.  Routine well woman exam done today  2.  Pap:  The patient's last pap was never  .      Pap was done today.    Current ASCCP Guidelines reviewed.   3.  STD testing  was done   4.  Gardasil recommendations reviewed unsure if  vaccinated. Will ask her mom   5. The following were reviewed in today's visit: breast self exam, use and side effects of OCPs, family planning choices, exercise, and healthy diet  6. Had 2 [facility]  - [facility]  - we discussed family planning  ,all questions answered . Desires OCP for  now and will follow up in  3 months      CC:  Annual Gynecologic Examination    HPI: Tisha Brown is a 19 y.o. No obstetric history on file. who presents for annual gynecologic examination.    She has the following concerns:  desires birth control and  STD      Health Maintenance:    She wears her seatbelt routinely.    She does perform regular monthly self breast exams.    She feels safe at home.     Past Medical History:   Diagnosis Date    Abdominal pain     Obesity        Past Surgical History:   Procedure Laterality Date    DENTAL SURGERY         OB/Gyn History:    OB History    No obstetric history on file.         Family History   Problem Relation Age of Onset    Arthritis Mother     Asthma Mother     Depression Mother     ADD / ADHD Father     ADD / ADHD Sister     Depression Sister     Irritable bowel syndrome Maternal Grandmother     Stroke Maternal Grandmother     Heart disease Paternal Grandfather     Diabetes Maternal Aunt        Social History:  Social History     Socioeconomic History    Marital status: Single     Spouse name: Not on file    Number of children: Not on file    Years of education: Not on file    Highest education level: Not on file   Occupational History    Not on file   Tobacco Use    Smoking status: Never      Passive exposure: Current    Smokeless tobacco: Never   Vaping Use    Vaping status: Every Day    Substances: THC   Substance and Sexual Activity    Alcohol use: Yes     Comment: [facility]    Drug use: Yes    Sexual activity: Yes     Partners: Male   Other Topics Concern    Not on file   Social History Narrative    Not on file     Social Determinants of Health     Financial Resource Strain: Not on file   Food Insecurity: Not on file   Transportation Needs: Not on file   Physical Activity: Not on file   Stress: Not on file   Social Connections: Not on file   Intimate Partner Violence: Not on file   Housing Stability: Not on file       No Known Allergies      Current Outpatient Medications:     norgestimate-ethinyl estradiol (Sprintec 28) 0.25-35 MG-MCG per tablet, Take 1 tablet by mouth daily, Disp: 84 tablet, Rfl: 3    sertraline (ZOLOFT) 50 mg tablet, Take 1 tablet (50 mg total) by mouth daily, Disp: 90 tablet, Rfl: 1    Review of Systems:  Constitutional :no fever, feels well, no tiredness, no recent weight gain or loss  ENT: no ear ache, no loss of hearing, no nosebleeds or nasal discharge, no sore throat or hoarseness.  Cardiovascular: no complaints of slow or fast heart beat, no chest pain, no palpitations, no leg claudication or lower extremity edema.  Respiratory: no complaints of shortness of shortness of breath, no STILL  Breasts:no complaints of breast pain, breast lump, or nipple discharge  Gastrointestinal: no complaints of abdominal pain, constipation, nausea, vomiting, or diarrhea or bloody stools  Genitourinary : no complaints of dysuria, incontinence, pelvic pain, no dysmenorrhea, vaginal discharge or abnormal vaginal bleeding and as noted in HPI.  Musculoskeletal: no complaints of arthralgia, no myalgia, no joint swelling or stiffness, no limb pain or swelling.  Integumentary: no complaints of skin rash or lesion, itching or dry skin  Neurological: no complaints of headache, no confusion, no numbness or  "tingling, no dizziness or fainting    Objective      /70   Ht 5' 5\" (1.651 m)   Wt 111 kg (244 lb)   LMP 09/23/2024 (Approximate)   BMI 40.60 kg/m²     General:   appears stated age, cooperative, alert normal mood and affect   Lungs: Unlabored     Breasts: normal appearance, no masses or tenderness, Inspection negative, No nipple retraction or dimpling, No nipple discharge or bleeding, No axillary or supraclavicular adenopathy, Normal to palpation without dominant masses   Abdomen: soft, non-tender, without masses or organomegaly   Vulva: normal, normal female genitalia, Bartholin's, Urethra, [place] normal, no lesions, normal female hair distribution, no clitoral enlargement   Vagina: normal vagina, no discharge, exudate, lesion, or erythema   Urethra: normal   Cervix: Normal, no discharge. Nontender.   Uterus: normal size, contour, position, consistency, mobility, non-tender   Adnexa: no mass, fullness, tenderness   Psychiatric orientation to person, place, and time: normal. mood and affect: normal          "

## 2024-10-04 LAB
C TRACH DNA SPEC QL NAA+PROBE: NEGATIVE
N GONORRHOEA DNA SPEC QL NAA+PROBE: NEGATIVE

## 2024-10-07 LAB
LAB AP GYN PRIMARY INTERPRETATION: NORMAL
Lab: NORMAL

## 2024-11-15 ENCOUNTER — TELEPHONE (OUTPATIENT)
Dept: PSYCHIATRY | Facility: CLINIC | Age: 19
End: 2024-11-15

## 2024-11-18 ENCOUNTER — TELEMEDICINE (OUTPATIENT)
Dept: PSYCHIATRY | Facility: CLINIC | Age: 19
End: 2024-11-18
Payer: COMMERCIAL

## 2024-11-18 DIAGNOSIS — F90.0 ATTENTION DEFICIT HYPERACTIVITY DISORDER (ADHD), PREDOMINANTLY INATTENTIVE TYPE: ICD-10-CM

## 2024-11-18 DIAGNOSIS — F33.1 MODERATE EPISODE OF RECURRENT MAJOR DEPRESSIVE DISORDER (HCC): Primary | ICD-10-CM

## 2024-11-18 DIAGNOSIS — F32.A ANXIETY AND DEPRESSION: ICD-10-CM

## 2024-11-18 DIAGNOSIS — F41.9 ANXIETY AND DEPRESSION: ICD-10-CM

## 2024-11-18 PROCEDURE — 90792 PSYCH DIAG EVAL W/MED SRVCS: CPT | Performed by: NURSE PRACTITIONER

## 2024-11-18 RX ORDER — HYDROXYZINE HYDROCHLORIDE 10 MG/1
TABLET, FILM COATED ORAL
Qty: 60 TABLET | Refills: 1 | Status: SHIPPED | OUTPATIENT
Start: 2024-11-18 | End: 2024-11-18 | Stop reason: SDUPTHER

## 2024-11-18 RX ORDER — SERTRALINE HYDROCHLORIDE 25 MG/1
TABLET, FILM COATED ORAL
Qty: 30 TABLET | Refills: 1 | Status: SHIPPED | OUTPATIENT
Start: 2024-11-18 | End: 2024-11-18 | Stop reason: SDUPTHER

## 2024-11-18 RX ORDER — DEXTROAMPHETAMINE SACCHARATE, AMPHETAMINE ASPARTATE, DEXTROAMPHETAMINE SULFATE AND AMPHETAMINE SULFATE 1.25; 1.25; 1.25; 1.25 MG/1; MG/1; MG/1; MG/1
TABLET ORAL
Qty: 30 TABLET | Refills: 0 | Status: SHIPPED | OUTPATIENT
Start: 2024-11-18

## 2024-11-18 RX ORDER — HYDROXYZINE HYDROCHLORIDE 10 MG/1
TABLET, FILM COATED ORAL
Start: 2024-11-18

## 2024-11-18 RX ORDER — SERTRALINE HYDROCHLORIDE 25 MG/1
TABLET, FILM COATED ORAL
Start: 2024-11-18

## 2024-11-18 NOTE — PSYCH
TREATMENT PLAN (Medication Management Only)        [facility]    Name and Date of Birth:  Tisha Brown 19 y.o. 2005  Date of Treatment Plan: November 18, 2024  Diagnosis/Diagnoses:    1. Moderate episode of recurrent major depressive disorder (HCC)    2. Anxiety and depression    3. Attention deficit hyperactivity disorder (ADHD), predominantly inattentive type      Strengths/Personal Resources for Self-Care: supportive family, supportive friends.  Area/Areas of need (in own words): anxiety symptoms, depressive symptoms, ADHD symptoms.  1. Long Term Goal: alleviate depression.   Target Date: 6 months - 5/18/2025  Person/Persons responsible for completion of goal: Tisha  2.  Short Term Objective (s) - How will we reach this goal?:   A.  Provider new recommended medication/dosage changes and/or continue medication(s): continue current medications as prescribed.  B.  N/A.    Target Date: 6 months - 5/18/2025  Person/Persons Responsible for Completion of Goal: Tisha  Progress Towards Goals: initiating treatment  Treatment Modality: medication education at every visit  Review due 6 months from date of this plan: 6 months - 5/18/2025  Expected length of service: ongoing treatment unless revised  My Physician/PA/NP and I have developed this plan together and I agree to work on the goals and objectives. I understand the treatment goals that were developed for my treatment.

## 2024-11-18 NOTE — PSYCH
Virtual Regular Visit    Verification of patient location:    Patient is located at Home in the following state in which I hold an active license [place]      Assessment/Plan:    Problem List Items Addressed This Visit       Anxiety and depression    Relevant Medications    amphetamine-dextroamphetamine (ADDERALL, 5MG,) 5 MG tablet    sertraline (ZOLOFT) 25 mg tablet    hydrOXYzine HCL (ATARAX) 10 mg tablet     Other Visit Diagnoses         Moderate episode of recurrent major depressive disorder (HCC)    -  Primary    Relevant Medications    amphetamine-dextroamphetamine (ADDERALL, 5MG,) 5 MG tablet    sertraline (ZOLOFT) 25 mg tablet    hydrOXYzine HCL (ATARAX) 10 mg tablet      Attention deficit hyperactivity disorder (ADHD), predominantly inattentive type        Relevant Medications    amphetamine-dextroamphetamine (ADDERALL, 5MG,) 5 MG tablet    sertraline (ZOLOFT) 25 mg tablet    hydrOXYzine HCL (ATARAX) 10 mg tablet            Goals addressed in session: Eliminate mood fluctuations.  Will improve ability to focus and stay on task and improve at work.         Reason for visit is   Chief Complaint   Patient presents with    ADHD    Anxiety    Depression    Medication Management    Mood Swings    Virtual Regular Visit          Encounter provider MARILEE Jauregui      Recent Visits  Date Type Provider Dept   11/15/24 Telephone MAR[facility]E Jauregui [facility]   Showing recent visits within past 7 days and meeting all other requirements  Today's Visits  Date Type Provider Dept   11/18/24 Telemedicine [facility]E Jauregui  [facility]   Showing today's visits and meeting all other requirements  Future Appointments  No visits were found meeting these conditions.  Showing future appointments within next 150 days and meeting all other requirements       The patient was identified by name and date of birth. Tisha Brown was informed that this is a telemedicine visit and  that the visit is being conducted througheYeka platform. She agrees to proceed..  My office door was closed. No one else was in the room.  She acknowledged consent and understanding of privacy and security of the video platform. The patient has agreed to participate and understands they can discontinue the visit at any time.    Patient is aware this is a billable service.     Rene Brown is a 19 y.o. female who is being seen for the first time for psychiatric evaluation.  The patient has multiple concerns.  She is reporting inability to stay on task, inability to focus and procrastination.  As result, she is suffering at work and school.  She is having some depression and mood fluctuations and she is also having anxiety.  She had been prescribed Zoloft by her PCP but has not tried it yet.  She is looking for relief in her ability to stay on task and focus at work because she has had some reprimands from her supervisors.  She is also looking for medication for depression and anxiety because she is easily tearful especially when she gets upset for being reprimanded.  Past psychiatric history: Patient has never been seen by psychiatrist.  Patient has never been hospitalized psychiatrically.  She has been prescribed Zoloft by her PCP but has yet to take it.  She has never had a suicide attempt.  Family psychiatric history: Patient's mother suffers from depressive disorder and mood disorder.  Patient's father also suffers from attention deficit disorder and takes Adderall successfully.  There is no history of suicide in the family.  Drug and alcohol history: None  Access to weapons: None  Psychosocial history: The patient is born and raised in [place].  She is a graduate of [employer].  She is also attending [employer] in business for an associates degree.  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  She is presently working for a  [employer] on a part-time basis and she also works at a restaurant as a .  Tisha is a 19-year-old female who is suffering from inability to focus, inability to stay on task and is experiencing forgetfulness at work which has resulted in several reprimands from her supervisors.  When she is reprimanded, she becomes emotional and tearful and as a result becomes very anxious regarding entering workplace.  She has been prescribed Zoloft by PCP but is never taken it.  Her father is taking Adderall and does very well with that.  She is asking if she can give it a try and we will trial her on low-dose and monitor.  We also discussed the possibility of going on Vyvanse.  We will start Adderall 2.5 mg daily for approximately 3 to 5 days and then she can increase it to 5 mg daily.  If need be, we can increase it and add in the afternoon.  We are starting her on Zoloft 25 mg daily  We are starting her on hydroxyzine 10 mg, she may take 1-2 tabs every 6 hours as needed for anxiety.  She will follow-up with me in 3 to 4 weeks or sooner if necessary.      Safety plan: Patient is aware of the 24-hour on-call service offered by [facility].  Patient is also aware of the 24-hour crisis call service offered by [facility].  Patient has a very good support system with her family and friends.  The patient agrees to call 911 or go directly to the emergency room if she begins to experience any suicidal ideation.       Assessment & Plan  Anxiety and depression         Attention deficit hyperactivity disorder (ADHD), predominantly inattentive type    Orders:    amphetamine-dextroamphetamine (ADDERALL, 5MG,) 5 MG tablet; 1/2 tablet in the morning for 3 days, then increase to 1 whole tablet in the morning daily.    Moderate episode of recurrent major depressive disorder (HCC)    Orders:    sertraline (ZOLOFT) 25 mg tablet; 1 tab in AM    hydrOXYzine HCL (ATARAX) 10 mg tablet; 1-2 tabs every 6 hours PRN for anxiety    Treatment status:  Patient is starting treatment with us.  We will monitor her closely for improvement.  If she is having any side effects from medications, she is to contact us immediately.    Past Medical History:   Diagnosis Date    Abdominal pain     Obesity        Past Surgical History:   Procedure Laterality Date    DENTAL SURGERY         Current Outpatient Medications   Medication Sig Dispense Refill    amphetamine-dextroamphetamine (ADDERALL, 5MG,) 5 MG tablet 1/2 tablet in the morning for 3 days, then increase to 1 whole tablet in the morning daily. 30 tablet 0    hydrOXYzine HCL (ATARAX) 10 mg tablet 1-2 tabs every 6 hours PRN for anxiety      sertraline (ZOLOFT) 25 mg tablet 1 tab in AM      norgestimate-ethinyl estradiol (Sprintec 28) 0.25-35 MG-MCG per tablet Take 1 tablet by mouth daily 84 tablet 3     No current facility-administered medications for this visit.        No Known Allergies    Review of Systems    Video Exam    There were no vitals filed for this visit.    Physical Exam     Visit Time    Visit Start Time: 1:00p  Visit Stop Time: 1:55p  Total Visit Duration: 55 minutes were spent in the visit.  Time spent reviewing the patient's record, establishing a treatment plan, ordering medications and completing the progress note.

## 2024-12-18 ENCOUNTER — TELEPHONE (OUTPATIENT)
Dept: PSYCHIATRY | Facility: CLINIC | Age: 19
End: 2024-12-18

## 2025-01-09 ENCOUNTER — OFFICE VISIT (OUTPATIENT)
Dept: OBGYN CLINIC | Facility: CLINIC | Age: 20
End: 2025-01-09
Payer: COMMERCIAL

## 2025-01-09 VITALS
DIASTOLIC BLOOD PRESSURE: 68 MMHG | HEIGHT: 65 IN | BODY MASS INDEX: 38.82 KG/M2 | WEIGHT: 233 LBS | SYSTOLIC BLOOD PRESSURE: 118 MMHG

## 2025-01-09 DIAGNOSIS — Z82.49 FAMILY HISTORY OF PULMONARY EMBOLISM: ICD-10-CM

## 2025-01-09 DIAGNOSIS — N92.6 IRREGULAR BLEEDING: Primary | ICD-10-CM

## 2025-01-09 DIAGNOSIS — Z82.49 FAMILY HISTORY OF DEEP VEIN THROMBOSIS: ICD-10-CM

## 2025-01-09 PROCEDURE — 99213 OFFICE O/P EST LOW 20 MIN: CPT | Performed by: OBSTETRICS & GYNECOLOGY

## 2025-01-09 RX ORDER — ACETAMINOPHEN AND CODEINE PHOSPHATE 120; 12 MG/5ML; MG/5ML
1 SOLUTION ORAL DAILY
Qty: 84 TABLET | Refills: 0 | Status: SHIPPED | OUTPATIENT
Start: 2025-01-09

## 2025-01-09 NOTE — PROGRESS NOTES
Assessment Tisha was seen today for follow-up.    Diagnoses and all orders for this visit:    Irregular bleeding  -     US pelvis complete w transvaginal; Future  -     norethindrone (Beth) 0.35 MG tablet; Take 1 tablet (0.35 mg total) by mouth daily    Family history of pulmonary embolism  -     Thrombosis Panel; Future    Family history of deep vein thrombosis  -     Thrombosis Panel; Future         Plan  We discussed  and ordered pelvic US given episode of cramps and irregular spotting   We discussed  progesterone only  birth control for now  until clarification of  family  hx on her  dad of  unprovoked  PE and  DVT's   We discussed and ordered thrombophilia panel given this update on her  hx   All questions answered   Follow  up in 3 months   Subjective   Tisha Brown is a 19 y.o. female here for a follow  up visit after initiation of ocp for birth control   States first 2 months noticed less cramps and  bleeding with menses but last  third month on  OCP has  increased cramps and  bleeding - she has since stopped  OCP after 3 weeks of bleeding / had not missed pill prior to bleeding   During hx states her  dad has hx of  DVT and PE / she called him while  at visit and he  has  not had thrombophilia work up    Patient Active Problem List   Diagnosis    Childhood obesity, BMI  percentile    Depression with anxiety    Exposure to COVID-19 virus    History of COVID-19    Anxiety and depression    Birth control counseling       Gynecologic History  Patient's last menstrual period was 12/19/2024 (approximate).  The current method of family planning is none.    Past Medical History:   Diagnosis Date    Abdominal pain     Obesity      Past Surgical History:   Procedure Laterality Date    DENTAL SURGERY       Family History   Problem Relation Age of Onset    Arthritis Mother     Asthma Mother     Depression Mother     ADD / ADHD Father     ADD / ADHD Sister     Depression Sister     Irritable bowel syndrome  Maternal Grandmother     Stroke Maternal Grandmother     Heart disease Paternal Grandfather     Diabetes Maternal Aunt      Social History     Socioeconomic History    Marital status: Single     Spouse name: Not on file    Number of children: Not on file    Years of education: Not on file    Highest education level: Not on file   Occupational History    Not on file   Tobacco Use    Smoking status: Never     Passive exposure: Current    Smokeless tobacco: Never   Vaping Use    Vaping status: Every Day    Substances: THC   Substance and Sexual Activity    Alcohol use: Yes     Comment: [facility]    Drug use: Yes    Sexual activity: Yes     Partners: Male   Other Topics Concern    Not on file   Social History Narrative    Not on file     Social Drivers of Health     Financial Resource Strain: Not on file   Food Insecurity: Not on file   Transportation Needs: Not on file   Physical Activity: Not on file   Stress: Not on file   Social Connections: Not on file   Intimate Partner Violence: Not on file   Housing Stability: Not on file     No Known Allergies    Current Outpatient Medications:     amphetamine-dextroamphetamine (ADDERALL, 5MG,) 5 MG tablet, 1/2 tablet in the morning for 3 days, then increase to 1 whole tablet in the morning daily., Disp: 30 tablet, Rfl: 0    hydrOXYzine HCL (ATARAX) 10 mg tablet, 1-2 tabs every 6 hours PRN for anxiety, Disp: , Rfl:     norethindrone (Beth) 0.35 MG tablet, Take 1 tablet (0.35 mg total) by mouth daily, Disp: 84 tablet, Rfl: 0    sertraline (ZOLOFT) 25 mg tablet, 1 tab in AM, Disp: , Rfl:     norgestimate-ethinyl estradiol (Sprintec 28) 0.25-35 MG-MCG per tablet, Take 1 tablet by mouth daily (Patient not taking: Reported on 1/9/2025), Disp: 84 tablet, Rfl: 3    Review of Systems  Constitutional :no fever, feels well, no tiredness, no recent weight gain or loss  ENT: no ear ache, no loss of hearing, no nosebleeds or nasal discharge, no sore throat or hoarseness.  Cardiovascular: no  "complaints of slow or fast heart beat, no chest pain, no palpitations, no leg claudication or lower extremity edema.  Respiratory: no complaints of shortness of shortness of breath, no STILL  Breasts:no complaints of breast pain, breast lump, or nipple discharge  Gastrointestinal: no complaints of abdominal pain, constipation, nausea, vomiting, or diarrhea or bloody stools  Genitourinary : no complaints of dysuria, incontinence, pelvic pain, no dysmenorrhea, vaginal discharge or abnormal vaginal bleeding and as noted in HPI.  Musculoskeletal: no complaints of arthralgia, no myalgia, no joint swelling or stiffness, no limb pain or swelling.  Integumentary: no complaints of skin rash or lesion, itching or dry skin  Neurological: no complaints of headache, no confusion, no numbness or tingling, no dizziness or fainting     Objective     /68   Ht 5' 5\" (1.651 m)   Wt 106 kg (233 lb)   LMP 12/19/2024 (Approximate)   BMI 38.77 kg/m²     General:   appears stated age, cooperative, alert normal mood and affect   Lungs: Unlabored     Psychiatric orientation to person, place, and time: normal. mood and affect: normal      "

## 2025-01-21 ENCOUNTER — TELEPHONE (OUTPATIENT)
Age: 20
End: 2025-01-21

## 2025-01-21 NOTE — TELEPHONE ENCOUNTER
Patient called in to schedule a follow up appointment for 1.27.25 @ 12:00pm.    Writer verified the patients insurance/copay and advised to please check into My Chart 15 minutes prior.

## 2025-01-27 ENCOUNTER — TELEPHONE (OUTPATIENT)
Age: 20
End: 2025-01-27

## 2025-01-27 NOTE — TELEPHONE ENCOUNTER
Patient is calling regarding cancelling an appointment.    Date/Time: 1/27 @12    Reason: pt    Patient was rescheduled: YES [x] NO []  If yes, when was Patient reschedule for: 1/29 @9:30    Patient requesting call back to reschedule: YES [] NO [x]

## 2025-01-29 ENCOUNTER — TELEMEDICINE (OUTPATIENT)
Dept: PSYCHIATRY | Facility: CLINIC | Age: 20
End: 2025-01-29
Payer: COMMERCIAL

## 2025-01-29 ENCOUNTER — TELEPHONE (OUTPATIENT)
Dept: PSYCHIATRY | Facility: CLINIC | Age: 20
End: 2025-01-29

## 2025-01-29 DIAGNOSIS — F39 MOOD DISORDER (HCC): ICD-10-CM

## 2025-01-29 DIAGNOSIS — F90.0 ATTENTION DEFICIT HYPERACTIVITY DISORDER (ADHD), PREDOMINANTLY INATTENTIVE TYPE: ICD-10-CM

## 2025-01-29 DIAGNOSIS — F33.1 MODERATE EPISODE OF RECURRENT MAJOR DEPRESSIVE DISORDER (HCC): Primary | ICD-10-CM

## 2025-01-29 PROCEDURE — 99214 OFFICE O/P EST MOD 30 MIN: CPT | Performed by: NURSE PRACTITIONER

## 2025-01-29 RX ORDER — DEXTROAMPHETAMINE SACCHARATE, AMPHETAMINE ASPARTATE, DEXTROAMPHETAMINE SULFATE AND AMPHETAMINE SULFATE 5; 5; 5; 5 MG/1; MG/1; MG/1; MG/1
TABLET ORAL
Qty: 60 TABLET | Refills: 0 | Status: SHIPPED | OUTPATIENT
Start: 2025-01-29

## 2025-01-29 RX ORDER — LAMOTRIGINE 25 MG/1
TABLET ORAL
Qty: 60 TABLET | Refills: 1 | Status: SHIPPED | OUTPATIENT
Start: 2025-01-29

## 2025-01-29 NOTE — TELEPHONE ENCOUNTER
Received closed PA request via [employer] for amphetamine-dextroamphetamine 20 mg tablet   Called [facility]  Pharmacist stated PA NOT REQUIRED  Co pay $7.50

## 2025-01-29 NOTE — PSYCH
Virtual Regular Visit    Verification of patient location:    Patient is located at Home in the following state in which I hold an active license [place]      Assessment/Plan:    Problem List Items Addressed This Visit    None  Visit Diagnoses         Moderate episode of recurrent major depressive disorder (HCC)    -  Primary      Attention deficit hyperactivity disorder (ADHD), predominantly inattentive type          Mood disorder (HCC)                Goals addressed in session: Continue to work on ways to reduce stress, improve functioning.  Also obtain an individual therapist.    Depression Follow-up Plan Completed: Yes    Reason for visit is   Chief Complaint   Patient presents with    ADHD    Depression    Anxiety    Medication Management    Follow-up    Virtual Regular Visit          Encounter provider MARILEE Jauregui      Recent Visits  No visits were found meeting these conditions.  Showing recent visits within past 7 days and meeting all other requirements  Today's Visits  Date Type Provider Dept   01/29/25 Telemedicine MAR[facility]E Jauregui [facility]   Showing today's visits and meeting all other requirements  Future Appointments  No visits were found meeting these conditions.  Showing future appointments within next 150 days and meeting all other requirements       The patient was identified by name and date of birth. Tisha Brown was informed that this is a telemedicine visit and that the visit is being conducted throughthe [employer] platform. She agrees to proceed..  My office door was closed. No one else was in the room.  She acknowledged consent and understanding of privacy and security of the video platform. The patient has agreed to participate and understands they can discontinue the visit at any time.    Patient is aware this is a billable service.     Subjective  Tisha Brown is a 19 y.o. female has a history of depressive disorder, mood disorder and attention  deficit disorder.  She started taking her medications and then stopped after approximately 1 month.  She did not follow-up with her outpatient appointment.  She now wants to start treatment again.  We will start her on Adderall 20 mg which she may take up to twice per day and Lamictal 25 mg daily to increase up to 50 mg daily.  There is a family history of mood disorder and I believe she may be suffering from mood disorder also.  She reports more irritability or edginess.  More inability to stay on task and retain information and function at her best.  We will follow-up in 2 weeks or sooner if necessary.  Mental status exam: Patient is awake and alert and oriented x 3.  Mood is anxious and patient easily overwhelmed.  Patient is not suicidal, not homicidal and not psychotic.  Associations are intact.  No overt delusions.  Speech is clear and thoughts are well-organized, coherent and goal-directed.  Attention span is poor right now.  Patient needs to get back on her ADHD medication.  Impulse control is good.  Judgment and insight are fair.  Memory is good.  The patient will continue on:  She can stop Zoloft and Atarax.  Neither of which have worked very well for her.  Restart Adderall 20 mg in the morning and 10 or 20 mg in the afternoon  Lamictal 25 mg daily for 2 weeks then 50 mg daily  Follow-up with me in 2 weeks or sooner if necessary.      We have discussed their safety plan and pt agrees that if they experience unsafe thoughts that they will reach out to their supports including this office, the suicide hotline, and emergency services if necessary.     .      Assessment & Plan  Moderate episode of recurrent major depressive disorder (HCC)         Attention deficit hyperactivity disorder (ADHD), predominantly inattentive type    Orders:    amphetamine-dextroamphetamine (ADDERALL, 20MG,) 20 mg tablet; 1 tab in the morning and 1/2-1 tabs in the afternoon    Mood disorder (HCC)    Orders:    lamoTRIgine (LaMICtal)  25 mg tablet; 1 tab daily for 2 weeks then take 2 tabs daily        Past Medical History:   Diagnosis Date    Abdominal pain     Obesity        Past Surgical History:   Procedure Laterality Date    DENTAL SURGERY         Current Outpatient Medications   Medication Sig Dispense Refill    amphetamine-dextroamphetamine (ADDERALL, 5MG,) 5 MG tablet 1/2 tablet in the morning for 3 days, then increase to 1 whole tablet in the morning daily. 30 tablet 0    hydrOXYzine HCL (ATARAX) 10 mg tablet 1-2 tabs every 6 hours PRN for anxiety      norethindrone (Beth) 0.35 MG tablet Take 1 tablet (0.35 mg total) by mouth daily 84 tablet 0    norgestimate-ethinyl estradiol (Sprintec 28) 0.25-35 MG-MCG per tablet Take 1 tablet by mouth daily (Patient not taking: Reported on 1/9/2025) 84 tablet 3    sertraline (ZOLOFT) 25 mg tablet 1 tab in AM       No current facility-administered medications for this visit.        No Known Allergies    Review of Systems    Video Exam    There were no vitals filed for this visit.    Physical Exam     Visit Time    Visit Start Time: 9:30a  Visit Stop Time: 10:00a  Total Visit Duration: 30 minutes were spent in the visit.  Time spent reviewing the treatment plan, reviewing medications, ordering medications and completing the progress note.

## 2025-02-10 ENCOUNTER — TELEPHONE (OUTPATIENT)
Dept: PSYCHIATRY | Facility: CLINIC | Age: 20
End: 2025-02-10

## 2025-02-10 ENCOUNTER — TELEMEDICINE (OUTPATIENT)
Dept: PSYCHIATRY | Facility: CLINIC | Age: 20
End: 2025-02-10
Payer: COMMERCIAL

## 2025-02-10 DIAGNOSIS — F90.0 ATTENTION DEFICIT HYPERACTIVITY DISORDER (ADHD), PREDOMINANTLY INATTENTIVE TYPE: ICD-10-CM

## 2025-02-10 DIAGNOSIS — F41.8 DEPRESSION WITH ANXIETY: Primary | ICD-10-CM

## 2025-02-10 PROCEDURE — 99214 OFFICE O/P EST MOD 30 MIN: CPT | Performed by: NURSE PRACTITIONER

## 2025-02-10 RX ORDER — DEXTROAMPHETAMINE SACCHARATE, AMPHETAMINE ASPARTATE, DEXTROAMPHETAMINE SULFATE AND AMPHETAMINE SULFATE 5; 5; 5; 5 MG/1; MG/1; MG/1; MG/1
TABLET ORAL
Qty: 60 TABLET | Refills: 0 | Status: SHIPPED | OUTPATIENT
Start: 2025-02-27

## 2025-02-10 NOTE — TELEPHONE ENCOUNTER
PA for amphetamine-dextroamphetamine 20 mg tablet SUBMITTED to [facility]     via    []CMM-KEY:   [x]Surescripts-Case ID # [id number]   []Availity-Auth ID # NDC #   []Faxed to plan   []Other website   []Phone call Case ID #     []PA sent as URGENT    All office notes, labs and other pertaining documents and studies sent. Clinical questions answered. Awaiting determination from insurance company.     Turnaround time for your insurance to make a decision on your Prior Authorization can take 7-21 business days.

## 2025-02-10 NOTE — ASSESSMENT & PLAN NOTE
Zoloft 50 mg, half tab in the morning for 1 week then increase to 50 mg daily  Lamictal 50 mg daily

## 2025-02-10 NOTE — TELEPHONE ENCOUNTER
PA for amphetamine-dextroamphetamine 20 mg tablet  NOT REQUIRED     Reason (screenshot if applicable)          Patient advised by          [] MyChart Message  [x] Phone call   []LMOM  []L/M to call office as no active Communication consent on file  []Unable to leave detailed message as VM not approved on Communication consent       Pharmacy advised by    []Fax  [x]Phone call

## 2025-02-10 NOTE — PSYCH
Virtual Regular Visit    Verification of patient location:    Patient is located at Home in the following state in which I hold an active license [place]      Assessment/Plan:    Problem List Items Addressed This Visit       Depression with anxiety - Primary    Relevant Medications    sertraline (Zoloft) 50 mg tablet    amphetamine-dextroamphetamine (ADDERALL, 20MG,) 20 mg tablet (Start on 2/27/2025)     Other Visit Diagnoses         Attention deficit hyperactivity disorder (ADHD), predominantly inattentive type        Relevant Medications    sertraline (Zoloft) 50 mg tablet    amphetamine-dextroamphetamine (ADDERALL, 20MG,) 20 mg tablet (Start on 2/27/2025)            Goals addressed in session: Continue to work on self improvement goals.    Depression Follow-up Plan Completed: Yes    Reason for visit is   Chief Complaint   Patient presents with    ADHD    Depression    Anxiety    Virtual Regular Visit          Encounter provider MARILEE Jauregui      Recent Visits  No visits were found meeting these conditions.  Showing recent visits within past 7 days and meeting all other requirements  Today's Visits  Date Type Provider Dept   02/10/25 Telemedicine MARILEE Jauregui  [facility]   Showing today's visits and meeting all other requirements  Future Appointments  No visits were found meeting these conditions.  Showing future appointments within next 150 days and meeting all other requirements       The patient was identified by name and date of birth. Tisha Brown was informed that this is a telemedicine visit and that the visit is being conducted throughthe Nextly platform. She agrees to proceed..  My office door was closed.      No one else was in the room.  She acknowledged consent and understanding of privacy and security of the video platform. The patient has agreed to participate and understands they can discontinue the visit at any time.    Patient is aware this is a billable  service.     Rene  Tisha Brown is a 19 y.o. female who has a history of depression and anxiety and attention deficit disorder.  Over the last month, she has been working on self improvement goals.  As result, she is feeling better.  She takes her medications on time, she is going to the gym and she is eating better.  Doing a fantastic job at her workplace.  However, she is suffering from some depression since we have her off Zoloft.  I was wondering if the Zoloft was causing her more anxiety or mood instability but, off of it, she is feeling depressed.  Since it was working for her, we will get her back on 25 mg for a week and then up to 50 mg.  She will follow-up with me in 1 month or sooner if necessary.  Getting better sleep at night,  Mental status exam: Patient is awake and alert and oriented x 3.  Mood is improving.  However, she is feeling depressed so we will restart Zoloft.  Associations are intact.  No overt delusions.  Speech is clear and thoughts are well-organized, coherent and goal-directed.  Attention span is good with the Adderall.  Impulse control is good.  Judgment and insight have improved.  Memory is good.    The patient will continue on:  Restart Zoloft 25 mg daily for 1 week then increase to 50 mg daily  Adderall 20 mg in the morning and 10 to 20 mg in the afternoon  Lamictal 50 mg daily starting in 2 days  Follow-up with me in 1 month or sooner if necessary      We have discussed their safety plan and pt agrees that if they experience unsafe thoughts that they will reach out to their supports including this office, the suicide hotline, and emergency services if necessary.     Assessment & Plan  Attention deficit hyperactivity disorder (ADHD), predominantly inattentive type    Orders:    sertraline (Zoloft) 50 mg tablet; 1/2 tab in AM for 1 week, then 1 tab Daily in AM    amphetamine-dextroamphetamine (ADDERALL, 20MG,) 20 mg tablet; 1 tab in the morning and 1/2-1 tabs in the afternoon  Do not start before February 27, 2025.    Depression with anxiety  Zoloft 50 mg, half tab in the morning for 1 week then increase to 50 mg daily  Lamictal 50 mg daily                   Past Medical History:   Diagnosis Date    Abdominal pain     Obesity        Past Surgical History:   Procedure Laterality Date    DENTAL SURGERY         Current Outpatient Medications   Medication Sig Dispense Refill    [START ON 2/27/2025] amphetamine-dextroamphetamine (ADDERALL, 20MG,) 20 mg tablet 1 tab in the morning and 1/2-1 tabs in the afternoon Do not start before February 27, 2025. 60 tablet 0    sertraline (Zoloft) 50 mg tablet 1/2 tab in AM for 1 week, then 1 tab Daily in AM      lamoTRIgine (LaMICtal) 25 mg tablet 1 tab daily for 2 weeks then take 2 tabs daily 60 tablet 1    norethindrone (Beth) 0.35 MG tablet Take 1 tablet (0.35 mg total) by mouth daily 84 tablet 0    norgestimate-ethinyl estradiol (Sprintec 28) 0.25-35 MG-MCG per tablet Take 1 tablet by mouth daily (Patient not taking: Reported on 1/9/2025) 84 tablet 3     No current facility-administered medications for this visit.        No Known Allergies    Review of Systems    Video Exam    There were no vitals filed for this visit.    Physical Exam     Visit Time    Visit Start Time: 12:00p  Visit Stop Time: 12:30p  Total Visit Duration: 30 minutes were spent in the visit.  Time spent reviewing the treatment plan, reviewing medications, ordering medications and completing the progress note.

## 2025-03-10 ENCOUNTER — TELEMEDICINE (OUTPATIENT)
Dept: PSYCHIATRY | Facility: CLINIC | Age: 20
End: 2025-03-10
Payer: COMMERCIAL

## 2025-03-10 DIAGNOSIS — F39 MOOD DISORDER (HCC): ICD-10-CM

## 2025-03-10 DIAGNOSIS — F90.0 ATTENTION DEFICIT HYPERACTIVITY DISORDER (ADHD), PREDOMINANTLY INATTENTIVE TYPE: ICD-10-CM

## 2025-03-10 PROCEDURE — 99214 OFFICE O/P EST MOD 30 MIN: CPT | Performed by: NURSE PRACTITIONER

## 2025-03-10 RX ORDER — LAMOTRIGINE 25 MG/1
TABLET ORAL
Start: 2025-03-10

## 2025-03-11 NOTE — PSYCH
MEDICATION MANAGEMENT NOTE    Name: Tisha Brown      : 2005      MRN: [id number]  Encounter Provider: MARILEE Goldman  Encounter Date: 3/10/2025   Encounter department: [facility]    Insurance: Payor: BLUE CROSS / Plan: FED[employer]AL EMPLOYEE PROGRAM / Product Type: Blue Fee for Service /      Reason for Visit:   Chief Complaint   Patient presents with    Anxiety    Medication Management    Follow-up   :  Assessment & Plan  Attention deficit hyperactivity disorder (ADHD), predominantly inattentive type    Orders:    sertraline (Zoloft) 50 mg tablet; 1/2 tab in AM  Adderall 20 mg in the morning and 10 or 20 mg in the afternoon  Mood disorder (HCC)    Orders:    lamoTRIgine (LaMICtal) 25 mg tablet; 2 tabs Daily        Treatment Recommendations:    Educated about diagnosis and treatment modalities. Verbalizes understanding and agreement with the treatment plan.  Discussed self monitoring of symptoms, and symptom monitoring tools.  Discussed medications and if treatment adjustment was needed or desired.  Aware of 24 hour and weekend coverage for urgent situations accessed by calling [facility] main practice number  I am scheduling this patient out for greater than 3 months: No    Medications Risks/Benefits:      Risks, Benefits And Possible Side Effects Of Medications:    Risks, benefits, and possible side effects of medications explained to Tisha and she (or legal representative) verbalizes understanding and agreement for treatment.    Controlled Medication Discussion:     Not applicable      History of Present Illness     CC: Tisha presents today for follow up on 03/10/2025     Tisha is taking her medications as prescribed.  She is feeling more anxious.  It could be the Zoloft.  We will reduce it back down to 25 mg daily.  She was on that before but was not taking it regularly.  Now that she is taking it regularly, it could be causing some mild  mood fluctuation.  We will reduce and monitor.  Otherwise, Adderall is working very well and Lamictal is working wonderfully to eliminate any mood instability.  She has not been irritated for feeling angry or agitated.  She feels the Lamictal has been quite helpful.    Med Compliance: yes    Since our last visit, overall symptoms have been gradually improving.  Patient reports some anxiety which could be related to Zoloft recent increase.  We will reduce and monitor.    HPI ROS:     Medication Side Effects: Some mild anxiety associated with increase of Zoloft.  We will monitor.  Depression: 0 /10 (10 worst)  Anxiety: 3 /10 (10 worst)  Safety concerns (SI, HI, others): None  Sleep: Good  Energy: Normal levels.  Reportedly going to the gym  Appetite: Better. Less Indulgent  Weight Change: Slow and steady weight loss    Tisha has mild side effects that are not clinically serious and is comfortable and able to tolerate this due to preferred continuation of medication.    Review Of Systems: A review of systems is obtained and is negative except for the pertinent positives listed in HPI/Subjective above.      Current Rating Scores:     None completed today.    Areas of Improvement: reviewed in HPI/Subjective Section and reviewed in Assessment and Plan Section        Past Medical History:   Diagnosis Date    Abdominal pain     Obesity         Past Surgical History:   Procedure Laterality Date    DENTAL SURGERY       Allergies: No Known Allergies    Current Outpatient Medications   Medication Sig Dispense Refill    amphetamine-dextroamphetamine (ADDERALL, 20MG,) 20 mg tablet 1 tab in the morning and 1/2-1 tabs in the afternoon Do not start before February 27, 2025. 60 tablet 0    lamoTRIgine (LaMICtal) 25 mg tablet 2 tabs Daily      sertraline (Zoloft) 50 mg tablet 1/2 tab in AM      norethindrone (Beth) 0.35 MG tablet Take 1 tablet (0.35 mg total) by mouth daily 84 tablet 0    norgestimate-ethinyl estradiol (Sprintec  28) 0.25-35 MG-MCG per tablet Take 1 tablet by mouth daily (Patient not taking: Reported on 1/9/2025) 84 tablet 3     No current facility-administered medications for this visit.       Substance Abuse History:    Social History     Substance and Sexual Activity   Alcohol Use Yes    Comment: [facility]     Social History     Substance and Sexual Activity   Drug Use Yes       Social History:    Social History     Socioeconomic History    Marital status: Single     Spouse name: Not on file    Number of children: Not on file    Years of education: Not on file    Highest education level: Not on file   Occupational History    Not on file   Tobacco Use    Smoking status: Never     Passive exposure: Current    Smokeless tobacco: Never   Vaping Use    Vaping status: Every Day    Substances: THC   Substance and Sexual Activity    Alcohol use: Yes     Comment: [facility]    Drug use: Yes    Sexual activity: Yes     Partners: Male   Other Topics Concern    Not on file   Social History Narrative    Not on file     Social Drivers of Health     Financial Resource Strain: Not on file   Food Insecurity: Not on file   Transportation Needs: Not on file   Physical Activity: Not on file   Stress: Not on file   Social Connections: Not on file   Intimate Partner Violence: Not on file   Housing Stability: Not on file       Family Psychiatric History:     Family History   Problem Relation Age of Onset    Arthritis Mother     Asthma Mother     Depression Mother     ADD / ADHD Father     ADD / ADHD Sister     Depression Sister     Irritable bowel syndrome Maternal Grandmother     Stroke Maternal Grandmother     Heart disease Paternal Grandfather     Diabetes Maternal Aunt        Medical History Reviewed by provider this encounter:  Tobacco  Allergies  Meds  Problems  Med Hx  Surg Hx  Fam Hx          Objective   There were no vitals taken for this visit.     Mental Status Evaluation:    Appearance age appropriate, casually dressed, dressed  appropriately   Behavior cooperative, calm   Speech Neck:normal rate, normal volume, normal pitch, spontaneous   Mood Mildly anxious, euthymic   Affect normal range and intensity, appropriate   Thought Processes organized, goal directed   Thought Content no overt delusions   Perceptual Disturbances: no auditory hallucinations, no visual hallucinations   Abnormal Thoughts  Risk Potential Suicidal ideation - None  Homicidal ideation - None  Potential for aggression - No   Orientation oriented to person, place, time/date, and situation   Memory recent and remote memory grossly intact   Consciousness alert and awake   Attention Span Concentration Span attention span and concentration are age appropriate   Intellect appears to be of average intelligence   Insight intact   Judgement intact   Muscle Strength and  Gait normal muscle strength and normal muscle tone, normal gait and normal balance   Motor activity no abnormal movements   Language echolalia   Fund of Knowledge adequate knowledge of current events  adequate fund of knowledge regarding past history  adequate fund of knowledge regarding vocabulary    Pain Normalnone   Pain Scale 0       Laboratory Results: I have personally reviewed all pertinent laboratory/tests results    Recent Labs (last 6 months):   Office Visit on 10/02/2024   Component Date Value    Case Report 10/02/2024                      Value:Gynecologic Cytology Report                       Case: [id number]                                  Authorizing Provider:  Barbara Nguyen MD  Collected:           10/02/2024 0922              Ordering Location:     [facility]      Received:            10/02/2024 0923                                     Penelope Vasquez                                                          First Screen:          Rosendo Jeffery                                                                 Specimen:    LIQUID-BASED PAP, SCREENING, Cervix, Endocervical                                           Primary Interpretation 10/02/2024 Negative for intraepithelial lesion or malignancy     Specimen Adequacy 10/02/2024 Satisfactory for evaluation. Endocervical/transformation zone component present.     Additional Information 10/02/2024                      Value:20[employer]'s FDA approved ,  and ThinPrep Imaging Duo System are utilized with strict adherence to the 's instruction manual to prepare gynecologic and non-gynecologic cytology specimens for the production of ThinPrep slides as well as for gynecologic ThinPrep imaging. These processes have been validated by our laboratory and/or by the .  The Pap test is not a diagnostic procedure and should not be used as the sole means to detect cervical cancer. It is only a screening procedure to aid in the detection of cervical cancer and its precursors. Both false-negative and false-positive results have been experienced. Your patient's test result should be interpreted in this context together with the history and clinical findings.      N gonorrhoeae, DNA Probe 10/02/2024 Negative     Chlamydia trachomatis, D* 10/02/2024 Negative        Suicide/Homicide Risk Assessment:    Risk of Harm to Self:  Based on today's assessment, Tisha presents the following risk of harm to self: none    Risk of Harm to Others:  Based on today's assessment, Tisha presents the following risk of harm to others: none    The following interventions are recommended: Continue medication management. Continue psychotherapy. No other intervention changes indicated at this time.    Psychotherapy Provided:     Individual psychotherapy provided: No    Treatment Plan:    Completed and signed during the session: Not applicable - Treatment Plan not due at this session    Goals: Progress towards Treatment Plan goals - Yes, progressing, as evidenced by subjective findings in HPI/Subjective Section and in Assessment and Plan  Section    Depression Follow-up Plan Completed: Yes    Note Share:    This note was shared with patient.    Administrative Statements   Administrative Statements   Encounter provider MARILEE Goldman    The Patient is located at Home and in the following state in which I hold an active license [place].    The patient was identified by name and date of birth. Tisha Brown was informed that this is a telemedicine visit and that the visit is being conducted through the Epic Embedded platform. She agrees to proceed..  .My office door was closed. No one else was in the room.  She acknowledged consent and understanding of privacy and security of the video platform. The patient has agreed to participate and understands they can discontinue the visit at any time.    I have spent a total time of 30 minutes in caring for this patient on the day of the visit/encounter including Counseling / Coordination of care, not including the time spent for establishing the audio/video connection.    Visit Time  Visit Start Time: 9:00AM .  Visit Stop Time: 9:25AM   Total Visit Duration:  25 minutes    MARILEE Goldman 03/11/25

## 2025-03-24 ENCOUNTER — TELEMEDICINE (OUTPATIENT)
Dept: PSYCHIATRY | Facility: CLINIC | Age: 20
End: 2025-03-24
Payer: COMMERCIAL

## 2025-03-24 DIAGNOSIS — F39 MOOD DISORDER (HCC): Primary | ICD-10-CM

## 2025-03-24 DIAGNOSIS — F90.0 ATTENTION DEFICIT HYPERACTIVITY DISORDER (ADHD), PREDOMINANTLY INATTENTIVE TYPE: ICD-10-CM

## 2025-03-24 DIAGNOSIS — F41.1 GAD (GENERALIZED ANXIETY DISORDER): ICD-10-CM

## 2025-03-24 PROCEDURE — 99214 OFFICE O/P EST MOD 30 MIN: CPT | Performed by: NURSE PRACTITIONER

## 2025-03-27 PROBLEM — F90.0 ATTENTION DEFICIT HYPERACTIVITY DISORDER (ADHD), PREDOMINANTLY INATTENTIVE TYPE: Status: ACTIVE | Noted: 2025-03-27

## 2025-03-27 PROBLEM — F39 MOOD DISORDER (HCC): Status: ACTIVE | Noted: 2025-03-27

## 2025-03-27 RX ORDER — LAMOTRIGINE 25 MG/1
TABLET ORAL
Qty: 60 TABLET | Refills: 2 | Status: SHIPPED | OUTPATIENT
Start: 2025-03-27

## 2025-03-27 RX ORDER — DEXTROAMPHETAMINE SACCHARATE, AMPHETAMINE ASPARTATE, DEXTROAMPHETAMINE SULFATE AND AMPHETAMINE SULFATE 5; 5; 5; 5 MG/1; MG/1; MG/1; MG/1
TABLET ORAL
Qty: 60 TABLET | Refills: 0 | Status: SHIPPED | OUTPATIENT
Start: 2025-03-27

## 2025-03-27 NOTE — ASSESSMENT & PLAN NOTE
Orders:    amphetamine-dextroamphetamine (ADDERALL, 20MG,) 20 mg tablet; 1 tab in the morning and 1/2-1 tabs in the afternoon

## 2025-03-27 NOTE — PSYCH
MEDICATION MANAGEMENT NOTE    Name: Tisha Brown      : 2005      MRN: [id number]  Encounter Provider: MARILEE Goldman  Encounter Date: 3/24/2025   Encounter department: [facility]    Insurance: Payor: [employer] CROSS / Plan: FEDERAL EMPLOYEE PROGRAM / Product Type: Blue Fee for Service /      Reason for Visit:   Chief Complaint   Patient presents with    Medication Management    Follow-up    Anxiety    ADHD    Virtual Regular Visit   :  Assessment & Plan  Mood disorder (HCC)    Orders:    lamoTRIgine (LaMICtal) 25 mg tablet; 2 tabs Daily  Zoloft 50 mg daily  Attention deficit hyperactivity disorder (ADHD), predominantly inattentive type    Orders:    amphetamine-dextroamphetamine (ADDERALL, 20MG,) 20 mg tablet; 1 tab in the morning and 1/2-1 tabs in the afternoon    The patient will continue on:  Lamictal 50 mg daily  Zoloft 50 mg daily  Adderall 20 mg in the morning and may take 10 or 20 mg in the afternoon  Follow-up with me in 5 weeks or sooner if necessary.    Treatment Recommendations:    Educated about diagnosis and treatment modalities. Verbalizes understanding and agreement with the treatment plan.  Discussed self monitoring of symptoms, and symptom monitoring tools.  Discussed medications and if treatment adjustment was needed or desired.  Aware of 24 hour and weekend coverage for urgent situations accessed by calling [facility] main practice number  I am scheduling this patient out for greater than 3 months: No    Medications Risks/Benefits:      Risks, Benefits And Possible Side Effects Of Medications:    Risks, benefits, and possible side effects of medications explained to Tisha and she (or legal representative) verbalizes understanding and agreement for treatment.    Controlled Medication Discussion:     Not applicable      History of Present Illness     CC: Tisha presents today for follow up on 2025 for treatment of anxiety  "disorder, attention deficit disorder and depression.     Tisha reports that she did decrease Zoloft on her last visit at her request but increased it back because she was feeling \"more depressed \".  She also has some difficulty managing her emotions.  For example, at her workplace, she had an incident with her supervisor and she became very tearful and overwhelmed and was sobbing.  Still having mood fluctuations so I increased the Lamictal up to 50 mg daily and we will monitor.  She is to continue in psychotherapy.  Follow-up with me in 5 weeks or sooner if necessary.    Med Compliance: yes    Since our last visit, overall symptoms have been gradually improving.  Still reports some anxiety some depression.  Lets monitor medication adjustments    HPI ROS:     Medication Side Effects: None  Depression: 4 /10 (10 worst)  Anxiety: 4 /10 (10 worst)  Safety concerns (SI, HI, others): None  Sleep: Good  Energy: Good.  Does very well at work.  Appetite: Good.  Patient is also exercising on a regular basis  Weight Change: Reporting some weight reduction    Tisha denies any side effects from medications unless noted above.    Review Of Systems: A review of systems is obtained and is negative except for the pertinent positives listed in HPI/Subjective above.      Current Rating Scores:     None completed today.    Areas of Improvement: reviewed in HPI/Subjective Section and reviewed in Assessment and Plan Section            Past Medical History:   Diagnosis Date    Abdominal pain     Obesity         Past Surgical History:   Procedure Laterality Date    DENTAL SURGERY       Allergies: No Known Allergies    Current Outpatient Medications   Medication Sig Dispense Refill    amphetamine-dextroamphetamine (ADDERALL, 20MG,) 20 mg tablet 1 tab in the morning and 1/2-1 tabs in the afternoon 60 tablet 0    lamoTRIgine (LaMICtal) 25 mg tablet 2 tabs Daily 60 tablet 2    norethindrone (Beth) 0.35 MG tablet Take 1 tablet (0.35 mg " total) by mouth daily 84 tablet 0    norgestimate-ethinyl estradiol (Sprintec 28) 0.25-35 MG-MCG per tablet Take 1 tablet by mouth daily (Patient not taking: Reported on 1/9/2025) 84 tablet 3    sertraline (Zoloft) 50 mg tablet 1/2 tab in AM       No current facility-administered medications for this visit.       Substance Abuse History:    Social History     Substance and Sexual Activity   Alcohol Use Yes    Comment: [facility]     Social History     Substance and Sexual Activity   Drug Use Yes       Social History:    Social History     Socioeconomic History    Marital status: Single     Spouse name: Not on file    Number of children: Not on file    Years of education: Not on file    Highest education level: Not on file   Occupational History    Not on file   Tobacco Use    Smoking status: Never     Passive exposure: Current    Smokeless tobacco: Never   Vaping Use    Vaping status: Every Day    Substances: THC   Substance and Sexual Activity    Alcohol use: Yes     Comment: [facility]    Drug use: Yes    Sexual activity: Yes     Partners: Male   Other Topics Concern    Not on file   Social History Narrative    Not on file     Social Drivers of Health     Financial Resource Strain: Not on file   Food Insecurity: Not on file   Transportation Needs: Not on file   Physical Activity: Not on file   Stress: Not on file   Social Connections: Not on file   Intimate Partner Violence: Not on file   Housing Stability: Not on file       Family Psychiatric History:     Family History   Problem Relation Age of Onset    Arthritis Mother     Asthma Mother     Depression Mother     ADD / ADHD Father     ADD / ADHD Sister     Depression Sister     Irritable bowel syndrome Maternal Grandmother     Stroke Maternal Grandmother     Heart disease Paternal Grandfather     Diabetes Maternal Aunt        Medical History Reviewed by provider this encounter:  Tobacco  Allergies  Meds  Problems  Med Hx  Surg Hx  Fam Hx          Objective   There  were no vitals taken for this visit.     Mental Status Evaluation:    Appearance age appropriate, casually dressed, dressed appropriately   Behavior cooperative, mildly anxious   Speech normal rate, normal volume, normal pitch, spontaneous   Mood anxious   Affect normal range and intensity, appropriate, reactive   Thought Processes organized, goal directed   Thought Content no overt delusions   Perceptual Disturbances: no auditory hallucinations, no visual hallucinations   Abnormal Thoughts  Risk Potential Suicidal ideation - None  Homicidal ideation - None  Potential for aggression - No   Orientation oriented to person, place, time/date, and situation   Memory recent and remote memory grossly intact   Consciousness alert and awake   Attention Span Concentration Span attention span and concentration are age appropriate   Intellect appears to be of average intelligence   Insight intact   Judgement intact   Muscle Strength and  Gait normal muscle strength and normal muscle tone, normal gait and normal balance   Motor activity no abnormal movements   Language no difficulty naming common objects, no difficulty repeating a phrase, no difficulty writing a sentence   Fund of Knowledge adequate knowledge of current events  adequate fund of knowledge regarding past history  adequate fund of knowledge regarding vocabulary    Pain none   Pain Scale 0       Laboratory Results: I have personally reviewed all pertinent laboratory/tests results    Recent Labs (last 6 months):   Office Visit on 10/02/2024   Component Date Value    Case Report 10/02/2024                      Value:Gynecologic Cytology Report                       Case: [id number]                                  Authorizing Provider:  Barbara Nguyen MD  Collected:           10/02/2024 0922              Ordering Location:     [facility]      Received:            10/02/2024 0923                                     [facility]                                                           First Screen:          Rosendo Jeffery                                                                 Specimen:    LIQUID-BASED PAP, SCREENING, Cervix, Endocervical                                          Primary Interpretation 10/02/2024 Negative for intraepithelial lesion or malignancy     Specimen Adequacy 10/02/2024 Satisfactory for evaluation. Endocervical/transformation zone component present.     Additional Information 10/02/2024                      Value:Simbionix's FDA approved ,  and ThinPrep Imaging Duo System are utilized with strict adherence to the 's instruction manual to prepare gynecologic and non-gynecologic cytology specimens for the production of ThinPrep slides as well as for gynecologic ThinPrep imaging. These processes have been validated by our laboratory and/or by the .  The Pap test is not a diagnostic procedure and should not be used as the sole means to detect cervical cancer. It is only a screening procedure to aid in the detection of cervical cancer and its precursors. Both false-negative and false-positive results have been experienced. Your patient's test result should be interpreted in this context together with the history and clinical findings.      N gonorrhoeae, DNA Probe 10/02/2024 Negative     Chlamydia trachomatis, D* 10/02/2024 Negative        Suicide/Homicide Risk Assessment:    Risk of Harm to Self:  Based on today's assessment, Tisha presents the following risk of harm to self: none    Risk of Harm to Others:  Based on today's assessment, Tisha presents the following risk of harm to others: none    The following interventions are recommended: Continue medication management. No other intervention changes indicated at this time.    Psychotherapy Provided:     Individual psychotherapy provided: No    Treatment Plan:    Completed and signed during the session: Not applicable - Treatment Plan not due  at this session    Goals: Progress towards Treatment Plan goals - Yes, progressing, as evidenced by subjective findings in HPI/Subjective Section and in Assessment and Plan Section    Depression Follow-up Plan Completed: Yes    Note Share:    This note was shared with patient.    Administrative Statements   Administrative Statements   Encounter provider MARILEE Goldman    The Patient is located at Home and in the following state in which I hold an active license [place].    The patient was identified by name and date of birth. Tisha Brown was informed that this is a telemedicine visit and that the visit is being conducted through the Epic Embedded platform. She agrees to proceed..  My office door was closed. No one else was in the room.  She acknowledged consent and understanding of privacy and security of the video platform. The patient has agreed to participate and understands they can discontinue the visit at any time.    I have spent a total time of 30 minutes in caring for this patient on the day of the visit/encounter including Counseling / Coordination of care, not including the time spent for establishing the audio/video connection.    Visit Time  Visit Start Time: 12:00 PM  Visit Stop Time: 12:30 PM  Total Visit Duration:  30 minutes    MARILEE Goldman 03/27/25

## 2025-04-08 ENCOUNTER — TELEPHONE (OUTPATIENT)
Dept: PSYCHIATRY | Facility: CLINIC | Age: 20
End: 2025-04-08

## 2025-04-08 NOTE — TELEPHONE ENCOUNTER
A medical record request was received (from O) on 4/8/25. The request is from the [facility] with a date range of 4/7/22-4/7/25.    Paperwork faxed to [facility] office for review by Ramonita ALFRED

## 2025-04-28 ENCOUNTER — TELEMEDICINE (OUTPATIENT)
Dept: PSYCHIATRY | Facility: CLINIC | Age: 20
End: 2025-04-28
Payer: COMMERCIAL

## 2025-04-28 DIAGNOSIS — F39 MOOD DISORDER (HCC): Primary | ICD-10-CM

## 2025-04-28 DIAGNOSIS — F90.0 ATTENTION DEFICIT HYPERACTIVITY DISORDER (ADHD), PREDOMINANTLY INATTENTIVE TYPE: ICD-10-CM

## 2025-04-28 PROCEDURE — 99214 OFFICE O/P EST MOD 30 MIN: CPT | Performed by: NURSE PRACTITIONER

## 2025-04-28 RX ORDER — DEXTROAMPHETAMINE SACCHARATE, AMPHETAMINE ASPARTATE, DEXTROAMPHETAMINE SULFATE AND AMPHETAMINE SULFATE 5; 5; 5; 5 MG/1; MG/1; MG/1; MG/1
TABLET ORAL
Qty: 60 TABLET | Refills: 0 | Status: SHIPPED | OUTPATIENT
Start: 2025-04-28

## 2025-04-29 ENCOUNTER — TELEPHONE (OUTPATIENT)
Dept: PSYCHIATRY | Facility: CLINIC | Age: 20
End: 2025-04-29

## 2025-04-29 NOTE — ASSESSMENT & PLAN NOTE
Orders:    sertraline (Zoloft) 50 mg tablet; 1 tab in AM    amphetamine-dextroamphetamine (ADDERALL, 20MG,) 20 mg tablet; 1 tab in the morning and 1/2-1 tabs in the afternoon

## 2025-04-29 NOTE — PSYCH
MEDICATION MANAGEMENT NOTE    Name: Tisha Brown      : 2005      MRN: [id number]  Encounter Provider: MARILEE Goldman  Encounter Date: 2025   Encounter department: [facility]    Insurance: Payor: "[employer] CROSS / Plan: FEDERAL EMPLOYEE PROGRAM / Product Type: Blue Fee for Service /      Reason for Visit:   Chief Complaint   Patient presents with    Medication Management     Is good    Follow-up   :  Assessment & Plan  Attention deficit hyperactivity disorder (ADHD), predominantly inattentive type    Orders:    sertraline (Zoloft) 50 mg tablet; 1 tab in AM    amphetamine-dextroamphetamine (ADDERALL, 20MG,) 20 mg tablet; 1 tab in the morning and 1/2-1 tabs in the afternoon    Mood disorder (HCC)  Lamictal 50 mg daily       The patient will continue on:  Lamictal 50 mg daily  Zoloft 50 mg daily  Adderall 20 mg in the morning and may take 10 to 20 mg in the afternoon  Follow-up with me in 2 to 3 months or sooner if necessary    Treatment Recommendations:    Educated about diagnosis and treatment modalities. Verbalizes understanding and agreement with the treatment plan.  Discussed self monitoring of symptoms, and symptom monitoring tools.  Discussed medications and if treatment adjustment was needed or desired.  Aware of 24 hour and weekend coverage for urgent situations accessed by calling [facility] main practice number  I am scheduling this patient out for greater than 3 months: No    Medications Risks/Benefits:      Risks, Benefits And Possible Side Effects Of Medications:    Risks, benefits, and possible side effects of medications explained to Tisha and she (or legal representative) verbalizes understanding and agreement for treatment.    Controlled Medication Discussion:     Tisha has been filling controlled prescriptions on time as prescribed according to [place] Prescription Drug Monitoring Program.      History of Present  "Illness     CC: Tisha presents today for follow up on 04/28/2025.  Patient is being seen for mood disorder     Tisha on examination today, the patient is reporting she is doing \"very well \".  All meds are working.  She feels she has energy interest and motivation.  Currently awaiting word from another agency for therapy.  Otherwise, she is sleeping well and eating well and she is enjoying her life.  For example, she was out today shopping for herself on her day off.  Getting ready to go to [facility] for destination wedding for her coworker.  Enjoying life.    Med Compliance: yes    Since our last visit, overall symptoms have been stable.       HPI ROS:     Medication Side Effects: None  Depression: 0 /10 (10 worst)  Anxiety: 2 /10 (10 worst)  Safety concerns (SI, HI, others): None  Sleep: Adequate  Energy: Adequate  Appetite: Adequate  Weight Change: No reports of any weight changes or fluctuations    Tisha denies any side effects from medications unless noted above.    Review Of Systems: A review of systems is obtained and is negative except for the pertinent positives listed in HPI/Subjective above.      Current Rating Scores:     None completed today.    Areas of Improvement: reviewed in HPI/Subjective Section and reviewed in Assessment and Plan Section      Past Medical History:   Diagnosis Date    Abdominal pain     Obesity      Past Surgical History:   Procedure Laterality Date    DENTAL SURGERY       Allergies: No Known Allergies    Current Outpatient Medications   Medication Instructions    amphetamine-dextroamphetamine (ADDERALL, 20MG,) 20 mg tablet 1 tab in the morning and 1/2-1 tabs in the afternoon    lamoTRIgine (LaMICtal) 25 mg tablet 2 tabs Daily    norethindrone (YANELI) 0.35 mg, Oral, Daily    norgestimate-ethinyl estradiol (Sprintec 28) 0.25-35 MG-MCG per tablet 1 tablet, Oral, Daily    sertraline (Zoloft) 50 mg tablet 1 tab in AM        Substance Abuse History:    Tobacco, Alcohol and Drug " Use History     Tobacco Use    Smoking status: Never     Passive exposure: Current    Smokeless tobacco: Never   Vaping Use    Vaping status: Every Day    Substances: THC   Substance Use Topics    Alcohol use: Yes     Comment: [facility]    Drug use: Yes          Social History:    Social History     Socioeconomic History    Marital status: Single     Spouse name: Not on file    Number of children: Not on file    Years of education: Not on file    Highest education level: Not on file   Occupational History    Not on file   Other Topics Concern    Not on file   Social History Narrative    Not on file        Family Psychiatric History:     Family History   Problem Relation Age of Onset    Arthritis Mother     Asthma Mother     Depression Mother     ADD / ADHD Father     ADD / ADHD Sister     Depression Sister     Irritable bowel syndrome Maternal Grandmother     Stroke Maternal Grandmother     Heart disease Paternal Grandfather     Diabetes Maternal Aunt        Medical History Reviewed by provider this encounter:  Tobacco  Allergies  Meds  Problems  Med Hx  Surg Hx  Fam Hx          Objective   There were no vitals taken for this visit.     Mental Status Evaluation:    Appearance age appropriate, casually dressed, dressed appropriately   Behavior cooperative, calm   Speech normal rate, normal volume, normal pitch, spontaneous   Mood euthymic   Affect normal range and intensity, appropriate   Thought Processes organized, goal directed, linear   Thought Content no overt delusions   Perceptual Disturbances: no auditory hallucinations, no visual hallucinations   Abnormal Thoughts  Risk Potential Suicidal ideation - None  Homicidal ideation - None  Potential for aggression - No   Orientation oriented to person, place, time/date, and situation   Memory recent and remote memory grossly intact   Consciousness alert and awake   Attention Span Concentration Span attention span and concentration are age appropriate   Intellect  appears to be of average intelligence   Insight intact and good   Judgement intact and good   Muscle Strength and  Gait normal muscle strength and normal muscle tone, normal gait and normal balance   Motor activity no abnormal movements   Language no difficulty naming common objects, no difficulty repeating a phrase, no difficulty writing a sentence   Fund of Knowledge adequate knowledge of current events  adequate fund of knowledge regarding past history  adequate fund of knowledge regarding vocabulary        Laboratory Results: I have personally reviewed all pertinent laboratory/tests results    Recent Labs (last 6 months):   No visits with results within 6 Month(s) from this visit.   Latest known visit with results is:   Office Visit on 10/02/2024   Component Date Value    Case Report 10/02/2024                      Value:Gynecologic Cytology Report                       Case: [id number]                                  Authorizing Provider:  Barbara Nguyen MD  Collected:           10/02/2024 0922              Ordering Location:     [facility]      Received:            10/02/2024 0923                                     [facility] Screen:          Rosendo Jeffery                                                                 Specimen:    LIQUID-BASED PAP, SCREENING, Cervix, Endocervical                                          Primary Interpretation 10/02/2024 Negative for intraepithelial lesion or malignancy     Specimen Adequacy 10/02/2024 Satisfactory for evaluation. Endocervical/transformation zone component present.     Additional Information 10/02/2024                      Value:Element ID's FDA approved ,  and ThinPrep Imaging Duo System are utilized with strict adherence to the 's instruction manual to prepare gynecologic and non-gynecologic cytology specimens for the production of ThinPrep slides  as well as for gynecologic ThinPrep imaging. These processes have been validated by our laboratory and/or by the .  The Pap test is not a diagnostic procedure and should not be used as the sole means to detect cervical cancer. It is only a screening procedure to aid in the detection of cervical cancer and its precursors. Both false-negative and false-positive results have been experienced. Your patient's test result should be interpreted in this context together with the history and clinical findings.      N gonorrhoeae, DNA Probe 10/02/2024 Negative     Chlamydia trachomatis, D* 10/02/2024 Negative        Suicide/Homicide Risk Assessment:    Risk of Harm to Self:  Based on today's assessment, Tisha presents the following risk of harm to self: none    Risk of Harm to Others:  Based on today's assessment, Tisha presents the following risk of harm to others: none    The following interventions are recommended: Continue medication management. No other intervention changes indicated at this time.    Psychotherapy Provided:     Individual psychotherapy provided: No    Treatment Plan:    Completed and signed during the session: Yes - Treatment Plan done but not signed at time of office visit due to: Plan reviewed by video and verbal consent given due to virtual visit.    Goals: Progress towards Treatment Plan goals - Yes, progressing, as evidenced by subjective findings in HPI/Subjective Section and in Assessment and Plan Section    Depression Follow-up Plan Completed: Yes    Note Share:    This note was shared with patient.    Administrative Statements   Administrative Statements   Encounter provider MARILEE Goldman    The Patient is located at Home and in the following state in which I hold an active license [place].    The patient was identified by name and date of birth. Tisha Brown was informed that this is a telemedicine visit and that the visit is being conducted through the Epic Embedded  "platform. She agrees to proceed..  My office door was closed. No one else was in the room.  She acknowledged consent and understanding of privacy and security of the video platform. The patient has agreed to participate and understands they can discontinue the visit at any time.    I have spent a total time of 25 minutes in caring for this patient on the day of the visit/encounter including Counseling / Coordination of care, not including the time spent for establishing the audio/video connection.    Visit Time  Visit Start Time: 12:40PM  Visit Stop Time: 1:05Pm  Total Visit Duration:  25 minutes    Portions of the record may have been created with voice recognition software. Occasional wrong word or \"sound a like\" substitutions may have occurred due to the inherent limitations of voice recognition software. Read the chart carefully and recognize, using context, where substitutions have occurred.    MARILEE Goldman 04/28/25  "

## 2025-05-14 DIAGNOSIS — F90.0 ATTENTION DEFICIT HYPERACTIVITY DISORDER (ADHD), PREDOMINANTLY INATTENTIVE TYPE: ICD-10-CM

## 2025-05-14 RX ORDER — DEXTROAMPHETAMINE SACCHARATE, AMPHETAMINE ASPARTATE, DEXTROAMPHETAMINE SULFATE AND AMPHETAMINE SULFATE 5; 5; 5; 5 MG/1; MG/1; MG/1; MG/1
TABLET ORAL
Qty: 60 TABLET | Refills: 0 | Status: SHIPPED | OUTPATIENT
Start: 2025-05-14

## 2025-05-14 NOTE — TELEPHONE ENCOUNTER
Refill must be reviewed and completed by the office or provider. The refill is unable to be approved or denied by the medication management team.  Medication cannot be delegated.     [id number] 04/01/2025 03/27/2025 03/27/2025 Amphetamine Salt Combo (Tablet) 60.0 30 20 MG [facility] [place] [facility] [place] / 0 [place]   1 [id number] 01/29/2025 01/29/2025 01/29/2025 Amphetamine Salt Combo (Tablet) 60.0 30 20 MG NA [facility]

## 2025-05-14 NOTE — TELEPHONE ENCOUNTER
Medication Refill Request     Name of Medication Adderrall  Dose/Frequency 20mg  Quantity 60 tab  Verified pharmacy   [x]  Verified ordering Provider   []  Does patient have enough for the next 3 days? Yes [] No [x]  Does patient have a follow-up appointment scheduled? Yes [x] No []   If so when is appointment: 7/7 at 12pm

## 2025-06-04 ENCOUNTER — OFFICE VISIT (OUTPATIENT)
Dept: OBGYN CLINIC | Facility: MEDICAL CENTER | Age: 20
End: 2025-06-04
Payer: COMMERCIAL

## 2025-06-04 VITALS
SYSTOLIC BLOOD PRESSURE: 118 MMHG | HEIGHT: 65 IN | BODY MASS INDEX: 34.49 KG/M2 | WEIGHT: 207 LBS | DIASTOLIC BLOOD PRESSURE: 72 MMHG

## 2025-06-04 DIAGNOSIS — Z30.09 BIRTH CONTROL COUNSELING: Primary | ICD-10-CM

## 2025-06-04 DIAGNOSIS — N92.6 IRREGULAR BLEEDING: ICD-10-CM

## 2025-06-04 PROCEDURE — 99213 OFFICE O/P EST LOW 20 MIN: CPT | Performed by: OBSTETRICS & GYNECOLOGY

## 2025-06-04 RX ORDER — ACETAMINOPHEN AND CODEINE PHOSPHATE 120; 12 MG/5ML; MG/5ML
1 SOLUTION ORAL DAILY
Qty: 84 TABLET | Refills: 1 | Status: SHIPPED | OUTPATIENT
Start: 2025-06-04

## 2025-06-04 NOTE — ASSESSMENT & PLAN NOTE
We discussed  birth control options like we did  on  January 2025   We discussed  given her  fathers hx of  DVT and  PE (unsure etiology  ) a thrombosis  panel was ordered  but  not  completed  , reviewed again  today    We discussed  progesterone  only  methods vs ParaGard   Last visit we had  ordered an US  as well  given pelvic  cramps - recommend getting this done   Proper use of  OCP reviewed   Will follow  up  in October for  yearly

## 2025-06-04 NOTE — PROGRESS NOTES
"Name: Tisha Brown      : 2005      MRN: [id number]  Encounter Provider: Barbara Nguyen MD  Encounter Date: 2025   Encounter department: [facility]  :  Assessment & Plan  Birth control counseling       We discussed  birth control options like we did  on  2025   We discussed  given her  fathers hx of  DVT and  PE (unsure etiology  ) a thrombosis  panel was ordered  but  not  completed  , reviewed again  today    We discussed  progesterone  only  methods vs ParaGard   Last visit we had  ordered an US  as well  given pelvic  cramps - recommend getting this done   Proper use of  OCP reviewed   Will follow  up  in October for  yearly       Irregular bleeding    Orders:  •  norethindrone (Beth) 0.35 MG tablet; Take 1 tablet (0.35 mg total) by mouth daily        History of Present Illness   HPI  Tisha Brown is a 19 y.o. female who presents for  discussion  re  birth control. State she never picked up  birth control  in  January cause she  went to pharmacy and they did not have it    History obtained from: patient    Review of Systems   HENT: Negative.     Respiratory: Negative.     Cardiovascular: Negative.    Gastrointestinal: Negative.    Genitourinary: Negative.    Musculoskeletal: Negative.    Neurological: Negative.    Psychiatric/Behavioral: Negative.          Objective   /72   Ht 5' 5\" (1.651 m)   Wt 93.9 kg (207 lb)   BMI 34.45 kg/m²      Physical Exam  Vitals reviewed.   Constitutional:       Appearance: Normal appearance.   HENT:      Head: Normocephalic and atraumatic.      Nose: Nose normal.     Eyes:      Conjunctiva/sclera: Conjunctivae normal.     Pulmonary:      Effort: Pulmonary effort is normal.     Neurological:      General: No focal deficit present.      Mental Status: She is alert and oriented to person, place, and time.     Psychiatric:         Mood and Affect: Mood normal.         Behavior: Behavior normal.       "

## 2025-06-05 ENCOUNTER — HOSPITAL ENCOUNTER (OUTPATIENT)
Dept: ULTRASOUND IMAGING | Facility: MEDICAL CENTER | Age: 20
Discharge: HOME | End: 2025-06-05
Attending: OBSTETRICS & GYNECOLOGY
Payer: COMMERCIAL

## 2025-06-05 DIAGNOSIS — N92.6 IRREGULAR BLEEDING: ICD-10-CM

## 2025-06-05 PROCEDURE — 76856 US EXAM PELVIC COMPLETE: CPT

## 2025-06-05 PROCEDURE — 76830 TRANSVAGINAL US NON-OB: CPT

## 2025-06-11 ENCOUNTER — TELEPHONE (OUTPATIENT)
Dept: FAMILY MEDICINE CLINIC | Facility: CLINIC | Age: 20
End: 2025-06-11

## 2025-07-07 ENCOUNTER — TELEPHONE (OUTPATIENT)
Age: 20
End: 2025-07-07

## 2025-07-07 ENCOUNTER — TELEMEDICINE (OUTPATIENT)
Dept: PSYCHIATRY | Facility: CLINIC | Age: 20
End: 2025-07-07
Payer: COMMERCIAL

## 2025-07-07 DIAGNOSIS — F90.0 ATTENTION DEFICIT HYPERACTIVITY DISORDER (ADHD), PREDOMINANTLY INATTENTIVE TYPE: ICD-10-CM

## 2025-07-07 DIAGNOSIS — F39 MOOD DISORDER (HCC): ICD-10-CM

## 2025-07-07 PROCEDURE — 99213 OFFICE O/P EST LOW 20 MIN: CPT | Performed by: NURSE PRACTITIONER

## 2025-07-07 RX ORDER — LAMOTRIGINE 25 MG/1
TABLET ORAL
Qty: 60 TABLET | Refills: 2 | Status: SHIPPED | OUTPATIENT
Start: 2025-07-07

## 2025-07-07 RX ORDER — DEXTROAMPHETAMINE SACCHARATE, AMPHETAMINE ASPARTATE, DEXTROAMPHETAMINE SULFATE AND AMPHETAMINE SULFATE 5; 5; 5; 5 MG/1; MG/1; MG/1; MG/1
TABLET ORAL
Qty: 60 TABLET | Refills: 0 | Status: SHIPPED | OUTPATIENT
Start: 2025-07-07

## 2025-07-07 NOTE — PSYCH
"TREATMENT PLAN (Medication Management Only)        [facility]    Name and Date of Birth:  Tisha Brown 19 y.o. 2005  Date of Treatment Plan: July 7, 2025  Diagnosis/Diagnoses:    1. Attention deficit hyperactivity disorder (ADHD), predominantly inattentive type    2. Mood disorder (HCC)      Strengths/Personal Resources for Self-Care: supportive family, supportive friends.  Area/Areas of need (in own words): \" I am really doing well.  I am taking my medication exactly as I am supposed to\".  1. Long Term Goal: \" To continue to do well and function at my best\".   Target Date: 1 year - 7/7/2026  Person/Persons responsible for completion of goal: MARILEE Samano  2.  Short Term Objective (s) - How will we reach this goal?:   A.  Provider new recommended medication/dosage changes and/or continue medication(s): continue current medications as prescribed.  B.  N/A.    Target Date: 3 months - 10/7/2025  Person/Persons Responsible for Completion of Goal: MARILEE Samano  Progress Towards Goals: Continuing Treatment  Treatment Modality: medication management every 3 months  Review due 6 months from date of this plan: 6 months - 1/7/2026  Expected length of service: maintenance unless revised  My Physician/PA/NP and I have developed this plan together and I agree to work on the goals and objectives. I understand the treatment goals that were developed for my treatment.  "

## 2025-07-07 NOTE — ASSESSMENT & PLAN NOTE
Orders:    amphetamine-dextroamphetamine (ADDERALL, 20MG,) 20 mg tablet; 1 tab in the morning and 1 tab in afternoon    sertraline (Zoloft) 50 mg tablet; 1 tab in AM

## 2025-07-07 NOTE — TELEPHONE ENCOUNTER
PA for Adderall 20 mg SUBMITTED to [facility]     via    []CMM-KEY:   []Surescripts-Case ID #     [id number]     []Availity-Auth ID # NDC #   []Faxed to plan   []Other website   []Phone call Case ID #     []PA sent as URGENT    All office notes, labs and other pertaining documents and studies sent. Clinical questions answered. Awaiting determination from insurance company.     Turnaround time for your insurance to make a decision on your Prior Authorization can take 7-21 business days.

## 2025-07-07 NOTE — PSYCH
MEDICATION MANAGEMENT NOTE    Name: Tisha Brown      : 2005      MRN: [id number]  Encounter Provider: MARILEE Goldman  Encounter Date: 2025   Encounter department: [facility]    Insurance: Payor: BLUE CROSS / Plan: [employer] / Product Type: Blue Fee for Service /      Reason for Visit:   Chief Complaint   Patient presents with    Medication Management    Follow-up   :  Assessment & Plan  Attention deficit hyperactivity disorder (ADHD), predominantly inattentive type    Orders:    amphetamine-dextroamphetamine (ADDERALL, 20MG,) 20 mg tablet; 1 tab in the morning and 1 tab in afternoon    sertraline (Zoloft) 50 mg tablet; 1 tab in AM    Mood disorder (HCC)    Orders:    lamoTRIgine (LaMICtal) 25 mg tablet; 2 tabs Daily    The patient will continue on:  Lamictal 50 mg daily  Zoloft 50 mg daily,  Adderall 20 mg twice daily  Follow-up with me in 6 months or sooner if necessary      Treatment Recommendations:    Educated about diagnosis and treatment modalities. Verbalizes understanding and agreement with the treatment plan.  Discussed self monitoring of symptoms, and symptom monitoring tools.  Discussed medications and if treatment adjustment was needed or desired.  Aware of 24 hour and weekend coverage for urgent situations accessed by calling [facility] main practice number  I am scheduling this patient out for greater than 3 months: No    Medications Risks/Benefits:      Risks, Benefits And Possible Side Effects Of Medications:    Risks, benefits, and possible side effects of medications explained to Tisha and she (or legal representative) verbalizes understanding and agreement for treatment.    Controlled Medication Discussion:     Tisha has been filling controlled prescriptions on time as prescribed according to [place] Prescription Drug Monitoring Program.      History of Present Illness     CC: Tisha presents  today for follow up on 07/07/2025 for treatment of depressive disorder and ADHD and mood disorder     Tisha presents today doing very well.  Offers no complaints.  Feels happy and content.  Sleeping and eating well.  Enjoying her job and the people she works with.  Staying social and active.  Continue current treatment and follow-up with me in 6 months or sooner if necessary    Med Compliance: yes    Since our last visit, overall symptoms have been stable.       HPI ROS:     Medication Side Effects: None  Depression: 0 /10 (10 worst)  Anxiety: 2 /10 (10 worst)  Safety concerns (SI, HI, others): None  Sleep: Good  Energy: Good  Appetite: Good  Weight Change: No reports of weight changes or fluctuations    Tisha denies any side effects from medications unless noted above.    Review Of Systems: A review of systems is obtained and is negative except for the pertinent positives listed in HPI/Subjective above.      Current Rating Scores:     None completed today.    Areas of Improvement: reviewed in HPI/Subjective Section and reviewed in Assessment and Plan Section      Past Medical History[1]  Past Surgical History[2]  Allergies: Allergies[3]    Current Outpatient Medications   Medication Instructions    amphetamine-dextroamphetamine (ADDERALL, 20MG,) 20 mg tablet 1 tab in the morning and 1 tab in afternoon    lamoTRIgine (LaMICtal) 25 mg tablet 2 tabs Daily    norethindrone (YANELI) 0.35 mg, Oral, Daily    norgestimate-ethinyl estradiol (Sprintec 28) 0.25-35 MG-MCG per tablet 1 tablet, Oral, Daily    sertraline (Zoloft) 50 mg tablet 1 tab in AM        Substance Abuse History:    Tobacco, Alcohol and Drug Use History     Tobacco Use    Smoking status: Never     Passive exposure: Current    Smokeless tobacco: Never   Vaping Use    Vaping status: Every Day    Substances: THC   Substance Use Topics    Alcohol use: Yes     Comment: [facility]    Drug use: Yes          Social History:    Social History     Socioeconomic History     Marital status: Single     Spouse name: Not on file    Number of children: Not on file    Years of education: Not on file    Highest education level: Not on file   Occupational History    Not on file   Other Topics Concern    Not on file   Social History Narrative    Not on file        Family Psychiatric History:     Family History[4]    Medical History Reviewed by provider this encounter:  Tobacco  Allergies  Meds  Problems  Med Hx  Surg Hx  Fam Hx          Objective   There were no vitals taken for this visit.     Mental Status Evaluation:    Appearance age appropriate, casually dressed, dressed appropriately   Behavior cooperative, calm   Speech normal rate, normal volume, normal pitch, spontaneous   Mood euthymic   Affect normal range and intensity, appropriate   Thought Processes organized, goal directed, linear   Thought Content no overt delusions   Perceptual Disturbances: no auditory hallucinations, no visual hallucinations   Abnormal Thoughts  Risk Potential Suicidal ideation - None  Homicidal ideation - None  Potential for aggression - No   Orientation oriented to person, place, time/date, and situation   Memory recent and remote memory grossly intact   Consciousness alert and awake   Attention Span Concentration Span attention span and concentration are age appropriate   Intellect appears to be of average intelligence   Insight intact and good   Judgement intact and good   Muscle Strength and  Gait normal muscle strength and normal muscle tone, normal gait and normal balance   Motor activity no abnormal movements   Language no difficulty naming common objects, no difficulty repeating a phrase, no difficulty writing a sentence   Fund of Knowledge adequate knowledge of current events  adequate fund of knowledge regarding past history  adequate fund of knowledge regarding vocabulary        Laboratory Results: I have personally reviewed all pertinent laboratory/tests results    Recent Labs (last 6  months):   No visits with results within 6 Month(s) from this visit.   Latest known visit with results is:   Office Visit on 10/02/2024   Component Date Value    Case Report 10/02/2024                      Value:Gynecologic Cytology Report                       Case: [id number]                                  Authorizing Provider:  Barbara Nguyen MD  Collected:           10/02/2024 0922              Ordering Location:     [facility]      Received:            10/02/2024 0923                                     [facility]                                                          First Screen:          Rosendo Jeffery                                                                 Specimen:    LIQUID-BASED PAP, SCREENING, Cervix, Endocervical                                          Primary Interpretation 10/02/2024 Negative for intraepithelial lesion or malignancy     Specimen Adequacy 10/02/2024 Satisfactory for evaluation. Endocervical/transformation zone component present.     Additional Information 10/02/2024                      Value:[employer]'s FDA approved ,  and ThinPrep Imaging Duo System are utilized with strict adherence to the 's instruction manual to prepare gynecologic and non-gynecologic cytology specimens for the production of ThinPrep slides as well as for gynecologic ThinPrep imaging. These processes have been validated by our laboratory and/or by the .  The Pap test is not a diagnostic procedure and should not be used as the sole means to detect cervical cancer. It is only a screening procedure to aid in the detection of cervical cancer and its precursors. Both false-negative and false-positive results have been experienced. Your patient's test result should be interpreted in this context together with the history and clinical findings.      N gonorrhoeae, DNA Probe 10/02/2024 Negative     Chlamydia trachomatis, D* 10/02/2024 Negative         Suicide/Homicide Risk Assessment:    Risk of Harm to Self:  Based on today's assessment, Tisha presents the following risk of harm to self: none    Risk of Harm to Others:  Based on today's assessment, Tisha presents the following risk of harm to others: none    The following interventions are recommended: Continue medication management. No other intervention changes indicated at this time.    Psychotherapy Provided:     Individual psychotherapy provided: No    Treatment Plan:    Completed and signed during the session: Yes - Treatment Plan done but not signed at time of office visit due to: Plan reviewed by video and verbal consent given due to virtual visit.    Goals: Progress towards Treatment Plan goals - Yes, progressing, as evidenced by subjective findings in HPI/Subjective Section and in Assessment and Plan Section    Depression Follow-up Plan Completed: Yes    Note Share:    This note was shared with patient.    Administrative Statements   Administrative Statements   Encounter provider MARILEE Goldman    The Patient is located at Home and in the following state in which I hold an active license [place].    The patient was identified by name and date of birth. Tisha Brown was informed that this is a telemedicine visit and that the visit is being conducted through the Epic Embedded platform. She agrees to proceed..  My office door was closed. No one else was in the room.  She acknowledged consent and understanding of privacy and security of the video platform. The patient has agreed to participate and understands they can discontinue the visit at any time.    I have spent a total time of 25 minutes in caring for this patient on the day of the visit/encounter including Counseling / Coordination of care, not including the time spent for establishing the audio/video connection.    Visit Time  Visit Start Time: 12:00PM  Visit Stop Time: 12:25PM  Total Visit Duration: 25 minutes    Portions of the  "record may have been created with voice recognition software. Occasional wrong word or \"sound a like\" substitutions may have occurred due to the inherent limitations of voice recognition software. Read the chart carefully and recognize, using context, where substitutions have occurred.    MARILEE Goldman 07/07/25       [1]   Past Medical History:  Diagnosis Date    Abdominal pain     Obesity    [2]   Past Surgical History:  Procedure Laterality Date    DENTAL SURGERY     [3] No Known Allergies  [4]   Family History  Problem Relation Name Age of Onset    Arthritis Mother      Asthma Mother      Depression Mother      ADD / ADHD Father      ADD / ADHD Sister      Depression Sister      Irritable bowel syndrome Maternal Grandmother      Stroke Maternal Grandmother      Heart disease Paternal Grandfather      Diabetes Maternal Aunt       "

## 2025-07-07 NOTE — TELEPHONE ENCOUNTER
PA for Adderall 20 mg  CANCELLED due to     []Approval on file-dates approved   [x]Medication already on Formulary  []Brand Name Preferred  []Patient no longer covered by insurance  []Therapy Changed/Medication Discontinued    Patient advised by     [x]My Chart Message  []Phone call    Scanned into Media  No